# Patient Record
Sex: FEMALE | Race: WHITE | Employment: UNEMPLOYED | URBAN - METROPOLITAN AREA
[De-identification: names, ages, dates, MRNs, and addresses within clinical notes are randomized per-mention and may not be internally consistent; named-entity substitution may affect disease eponyms.]

---

## 2017-05-04 ENCOUNTER — APPOINTMENT (OUTPATIENT)
Dept: CT IMAGING | Age: 52
End: 2017-05-04
Attending: EMERGENCY MEDICINE
Payer: COMMERCIAL

## 2017-05-04 ENCOUNTER — HOSPITAL ENCOUNTER (EMERGENCY)
Age: 52
Discharge: HOME | End: 2017-05-04
Attending: EMERGENCY MEDICINE | Admitting: EMERGENCY MEDICINE
Payer: COMMERCIAL

## 2017-05-04 VITALS
RESPIRATION RATE: 20 BRPM | HEIGHT: 64 IN | SYSTOLIC BLOOD PRESSURE: 106 MMHG | TEMPERATURE: 97.9 F | BODY MASS INDEX: 36.47 KG/M2 | WEIGHT: 213.63 LBS | DIASTOLIC BLOOD PRESSURE: 46 MMHG | OXYGEN SATURATION: 95 % | HEART RATE: 78 BPM

## 2017-05-04 DIAGNOSIS — R10.9 FLANK PAIN: Primary | ICD-10-CM

## 2017-05-04 LAB
ALBUMIN SERPL BCP-MCNC: 3.7 G/DL (ref 3.5–5)
ALBUMIN/GLOB SERPL: 0.9 {RATIO} (ref 1.1–2.2)
ALP SERPL-CCNC: 87 U/L (ref 45–117)
ALT SERPL-CCNC: 27 U/L (ref 12–78)
ANION GAP BLD CALC-SCNC: 12 MMOL/L (ref 5–15)
ANION GAP BLD CALC-SCNC: 8 MMOL/L (ref 5–15)
APPEARANCE UR: ABNORMAL
AST SERPL W P-5'-P-CCNC: 16 U/L (ref 15–37)
BACTERIA URNS QL MICRO: NEGATIVE /HPF
BASOPHILS # BLD AUTO: 0 K/UL (ref 0–0.1)
BASOPHILS # BLD: 0 % (ref 0–1)
BILIRUB SERPL-MCNC: 0.8 MG/DL (ref 0.2–1)
BILIRUB UR QL: NEGATIVE
BUN SERPL-MCNC: 8 MG/DL (ref 6–20)
BUN SERPL-MCNC: 9 MG/DL (ref 6–20)
BUN/CREAT SERPL: 10 (ref 12–20)
BUN/CREAT SERPL: 9 (ref 12–20)
CALCIUM SERPL-MCNC: 8.7 MG/DL (ref 8.5–10.1)
CALCIUM SERPL-MCNC: 9 MG/DL (ref 8.5–10.1)
CHLORIDE SERPL-SCNC: 107 MMOL/L (ref 97–108)
CHLORIDE SERPL-SCNC: 108 MMOL/L (ref 97–108)
CO2 SERPL-SCNC: 23 MMOL/L (ref 21–32)
CO2 SERPL-SCNC: 25 MMOL/L (ref 21–32)
COLOR UR: ABNORMAL
CREAT SERPL-MCNC: 0.87 MG/DL (ref 0.55–1.02)
CREAT SERPL-MCNC: 0.91 MG/DL (ref 0.55–1.02)
EOSINOPHIL # BLD: 0 K/UL (ref 0–0.4)
EOSINOPHIL NFR BLD: 0 % (ref 0–7)
EPITH CASTS URNS QL MICRO: ABNORMAL /LPF
ERYTHROCYTE [DISTWIDTH] IN BLOOD BY AUTOMATED COUNT: 13.7 % (ref 11.5–14.5)
ERYTHROCYTE [DISTWIDTH] IN BLOOD BY AUTOMATED COUNT: 13.8 % (ref 11.5–14.5)
GLOBULIN SER CALC-MCNC: 4.1 G/DL (ref 2–4)
GLUCOSE SERPL-MCNC: 106 MG/DL (ref 65–100)
GLUCOSE SERPL-MCNC: 110 MG/DL (ref 65–100)
GLUCOSE UR STRIP.AUTO-MCNC: NEGATIVE MG/DL
HCT VFR BLD AUTO: 38.9 % (ref 35–47)
HCT VFR BLD AUTO: 39.2 % (ref 35–47)
HGB BLD-MCNC: 13.4 G/DL (ref 11.5–16)
HGB BLD-MCNC: 13.4 G/DL (ref 11.5–16)
HGB UR QL STRIP: ABNORMAL
INR PPP: 1.1 (ref 0.9–1.1)
KETONES UR QL STRIP.AUTO: 40 MG/DL
LEUKOCYTE ESTERASE UR QL STRIP.AUTO: NEGATIVE
LYMPHOCYTES # BLD AUTO: 14 % (ref 12–49)
LYMPHOCYTES # BLD: 1.8 K/UL (ref 0.8–3.5)
MCH RBC QN AUTO: 29.9 PG (ref 26–34)
MCH RBC QN AUTO: 30.1 PG (ref 26–34)
MCHC RBC AUTO-ENTMCNC: 34.2 G/DL (ref 30–36.5)
MCHC RBC AUTO-ENTMCNC: 34.4 G/DL (ref 30–36.5)
MCV RBC AUTO: 87.4 FL (ref 80–99)
MCV RBC AUTO: 87.5 FL (ref 80–99)
MONOCYTES # BLD: 0.6 K/UL (ref 0–1)
MONOCYTES NFR BLD AUTO: 5 % (ref 5–13)
MUCOUS THREADS URNS QL MICRO: ABNORMAL /LPF
NEUTS SEG # BLD: 10.6 K/UL (ref 1.8–8)
NEUTS SEG NFR BLD AUTO: 81 % (ref 32–75)
NITRITE UR QL STRIP.AUTO: NEGATIVE
PH UR STRIP: 6 [PH] (ref 5–8)
PLATELET # BLD AUTO: 237 K/UL (ref 150–400)
PLATELET # BLD AUTO: 249 K/UL (ref 150–400)
POTASSIUM SERPL-SCNC: 3.3 MMOL/L (ref 3.5–5.1)
POTASSIUM SERPL-SCNC: 3.4 MMOL/L (ref 3.5–5.1)
PROT SERPL-MCNC: 7.8 G/DL (ref 6.4–8.2)
PROT UR STRIP-MCNC: NEGATIVE MG/DL
PROTHROMBIN TIME: 10.7 SEC (ref 9–11.1)
RBC # BLD AUTO: 4.45 M/UL (ref 3.8–5.2)
RBC # BLD AUTO: 4.48 M/UL (ref 3.8–5.2)
RBC #/AREA URNS HPF: ABNORMAL /HPF (ref 0–5)
SODIUM SERPL-SCNC: 140 MMOL/L (ref 136–145)
SODIUM SERPL-SCNC: 143 MMOL/L (ref 136–145)
SP GR UR REFRACTOMETRY: 1.01 (ref 1–1.03)
UROBILINOGEN UR QL STRIP.AUTO: 0.2 EU/DL (ref 0.2–1)
WBC # BLD AUTO: 13 K/UL (ref 3.6–11)
WBC # BLD AUTO: 13.3 K/UL (ref 3.6–11)
WBC URNS QL MICRO: ABNORMAL /HPF (ref 0–4)
YEAST URNS QL MICRO: PRESENT

## 2017-05-04 PROCEDURE — 80048 BASIC METABOLIC PNL TOTAL CA: CPT | Performed by: EMERGENCY MEDICINE

## 2017-05-04 PROCEDURE — 85610 PROTHROMBIN TIME: CPT | Performed by: EMERGENCY MEDICINE

## 2017-05-04 PROCEDURE — 85027 COMPLETE CBC AUTOMATED: CPT | Performed by: EMERGENCY MEDICINE

## 2017-05-04 PROCEDURE — 74011250637 HC RX REV CODE- 250/637: Performed by: EMERGENCY MEDICINE

## 2017-05-04 PROCEDURE — 96374 THER/PROPH/DIAG INJ IV PUSH: CPT

## 2017-05-04 PROCEDURE — 74177 CT ABD & PELVIS W/CONTRAST: CPT

## 2017-05-04 PROCEDURE — 81001 URINALYSIS AUTO W/SCOPE: CPT | Performed by: EMERGENCY MEDICINE

## 2017-05-04 PROCEDURE — 96361 HYDRATE IV INFUSION ADD-ON: CPT

## 2017-05-04 PROCEDURE — 36415 COLL VENOUS BLD VENIPUNCTURE: CPT | Performed by: EMERGENCY MEDICINE

## 2017-05-04 PROCEDURE — 80053 COMPREHEN METABOLIC PANEL: CPT | Performed by: EMERGENCY MEDICINE

## 2017-05-04 PROCEDURE — 99284 EMERGENCY DEPT VISIT MOD MDM: CPT

## 2017-05-04 PROCEDURE — 74011250636 HC RX REV CODE- 250/636: Performed by: EMERGENCY MEDICINE

## 2017-05-04 PROCEDURE — 96375 TX/PRO/DX INJ NEW DRUG ADDON: CPT

## 2017-05-04 PROCEDURE — 74011636320 HC RX REV CODE- 636/320: Performed by: EMERGENCY MEDICINE

## 2017-05-04 PROCEDURE — 85025 COMPLETE CBC W/AUTO DIFF WBC: CPT | Performed by: EMERGENCY MEDICINE

## 2017-05-04 RX ORDER — MORPHINE SULFATE 2 MG/ML
4 INJECTION, SOLUTION INTRAMUSCULAR; INTRAVENOUS ONCE
Status: COMPLETED | OUTPATIENT
Start: 2017-05-04 | End: 2017-05-04

## 2017-05-04 RX ORDER — SODIUM CHLORIDE 0.9 % (FLUSH) 0.9 %
10 SYRINGE (ML) INJECTION
Status: COMPLETED | OUTPATIENT
Start: 2017-05-04 | End: 2017-05-04

## 2017-05-04 RX ORDER — OXYCODONE AND ACETAMINOPHEN 5; 325 MG/1; MG/1
2 TABLET ORAL
Status: COMPLETED | OUTPATIENT
Start: 2017-05-04 | End: 2017-05-04

## 2017-05-04 RX ORDER — MORPHINE SULFATE 4 MG/ML
4 INJECTION, SOLUTION INTRAMUSCULAR; INTRAVENOUS ONCE
Status: DISCONTINUED | OUTPATIENT
Start: 2017-05-04 | End: 2017-05-04 | Stop reason: HOSPADM

## 2017-05-04 RX ORDER — ONDANSETRON 2 MG/ML
4 INJECTION INTRAMUSCULAR; INTRAVENOUS
Status: DISCONTINUED | OUTPATIENT
Start: 2017-05-04 | End: 2017-05-04 | Stop reason: HOSPADM

## 2017-05-04 RX ORDER — SODIUM CHLORIDE 9 MG/ML
50 INJECTION, SOLUTION INTRAVENOUS
Status: COMPLETED | OUTPATIENT
Start: 2017-05-04 | End: 2017-05-04

## 2017-05-04 RX ORDER — DIPHENHYDRAMINE HYDROCHLORIDE 50 MG/ML
25 INJECTION, SOLUTION INTRAMUSCULAR; INTRAVENOUS
Status: COMPLETED | OUTPATIENT
Start: 2017-05-04 | End: 2017-05-04

## 2017-05-04 RX ORDER — OXYCODONE AND ACETAMINOPHEN 10; 325 MG/1; MG/1
1 TABLET ORAL
Qty: 10 TAB | Refills: 0 | Status: SHIPPED | OUTPATIENT
Start: 2017-05-04 | End: 2017-05-19

## 2017-05-04 RX ADMIN — OXYCODONE HYDROCHLORIDE AND ACETAMINOPHEN 2 TABLET: 5; 325 TABLET ORAL at 14:54

## 2017-05-04 RX ADMIN — SODIUM CHLORIDE 1000 ML: 900 INJECTION, SOLUTION INTRAVENOUS at 14:18

## 2017-05-04 RX ADMIN — DIPHENHYDRAMINE HYDROCHLORIDE 25 MG: 50 INJECTION, SOLUTION INTRAMUSCULAR; INTRAVENOUS at 14:53

## 2017-05-04 RX ADMIN — Medication 4 MG: at 12:51

## 2017-05-04 RX ADMIN — SODIUM CHLORIDE 50 ML/HR: 900 INJECTION, SOLUTION INTRAVENOUS at 13:58

## 2017-05-04 RX ADMIN — SODIUM CHLORIDE 1000 ML: 900 INJECTION, SOLUTION INTRAVENOUS at 12:50

## 2017-05-04 RX ADMIN — Medication 10 ML: at 13:58

## 2017-05-04 RX ADMIN — IOPAMIDOL 100 ML: 755 INJECTION, SOLUTION INTRAVENOUS at 13:58

## 2017-05-04 NOTE — ED PROVIDER NOTES
HPI Comments: Shirin Finn is a 45 y.o. female with pertinent PMHx of GERD who presents via EMS to ED c/o right flank pain and RLQ pain for the past 3 days, along with associated nausea, vomiting, and decreased appetite. Pt also notes moderate right sided back pain recently. Pt states her pain has worsened to 10/10 today. Pt's spouse had similar symptoms last week. EMS reports administering Fentanyl, which did not improve her pain. Pt adds that she has chronic right leg pain and right leg numbness, but she denies any recent change or worsening. Her right foot is not cold, and she has no rash on the right foot. Her PCP has discontinued all narcotic pain medication. Pt denies any fever, chills, chest pain, shortness of breath. Social Hx:  tobacco (former, 0.75 pk yrs), EtOH (rare)  There are no other complaints, changes or physical findings at this time. The history is provided by the patient and the EMS personnel. No  was used. Past Medical History:  
Diagnosis Date  Adverse effect of anesthesia HARD TO WAKE UP  
 Arthritis  Chronic pain BACK  Coagulation disorder ([address] HX ANEMIA  GERD (gastroesophageal reflux disease) OCCASIONAL  
 Nausea & vomiting  Psychiatric disorder ANXIETY AND DEPRESSION  
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  
 Unspecified adverse effect of anesthesia TROUBLE WAKING IN THE PAST Past Surgical History:  
Procedure Laterality Date [address] [place]  HX GYN  2006 TUBAL PREGNANCY AFTER TUBAL LIGATION  
 HX GYN  2010 UTERINE POLYPS REMOVED  HX GYN  2010 THINNING OF UTERUS  
 HX HEENT    
 MOUTH/DENTAL SURGERY  
 HX OTHER SURGICAL    
 R FOOT  
 HX TUBAL LIGATION  2006 Family History:  
Problem Relation Age of Onset  Hypertension Father  Cancer Father BLADDER  
 Other Father BLOOD CLOTS  
 COPD Father  Heart Disease Paternal Grandmother  Anesth Problems Neg Hx  Seizures Brother  Seizures Brother  Seizures Brother  Other Son BENIGN BONE MARROW TUMORS  
 Seizures Brother  Psychiatric Disorder Brother DEPRESSION Social History Social History  Marital status:  Spouse name: N/A  
 Number of children: N/A  
 Years of education: N/A Occupational History  Not on file. Social History Main Topics  Smoking status: Former Smoker Packs/day: 0.50 Years: 1.50  Smokeless tobacco: Never Used Comment: AS TEENAGER  
 Alcohol use Yes Comment: RARELY  Drug use: No  
 Sexual activity: Not on file Other Topics Concern  Not on file Social History Narrative ALLERGIES: Latex Review of Systems Constitutional: Positive for appetite change. Negative for activity change, chills, fever and unexpected weight change. HENT: Negative for congestion. Eyes: Negative for pain and visual disturbance. Respiratory: Negative for cough and shortness of breath. Cardiovascular: Negative for chest pain. Gastrointestinal: Positive for abdominal pain, nausea and vomiting. Negative for diarrhea. Genitourinary: Positive for flank pain. Negative for dysuria. Musculoskeletal: Positive for back pain and myalgias. Skin: Negative for rash. Neurological: Positive for numbness. Negative for headaches. Patient Vitals for the past 12 hrs: 
 Temp Pulse Resp BP SpO2  
05/04/17 1530 - - - 106/46 97 % 05/04/17 1500 - - - 109/46 94 % 05/04/17 1410 - - - 97/46 (!) 88 % 05/04/17 1405 - - - - 98 % 05/04/17 1338 - - - - 97 % 05/04/17 1330 - - - 114/59 98 % 05/04/17 1227 97.9 °F (36.6 °C) 78 20 108/57 99 % Physical Exam  
Constitutional: She is oriented to person, place, and time. She appears well-developed and well-nourished. Morbidly obese female in moderate distress secondary to severity of pain.    
HENT:  
Head: Normocephalic and atraumatic. Mouth/Throat: Oropharynx is clear and moist.  
Eyes: Conjunctivae and EOM are normal. Pupils are equal, round, and reactive to light. Right eye exhibits no discharge. Left eye exhibits no discharge. Neck: Normal range of motion. Neck supple. Cardiovascular: Normal rate and normal heart sounds. No murmur heard. Right leg with good DP pulse. Pulmonary/Chest: Effort normal and breath sounds normal. No respiratory distress. She has no wheezes. She has no rales. Abdominal: Soft. Bowel sounds are normal.  
Protuberant with tenderness on any superficial palpation, however without any tenderness when listening to bowel sounds with deep pressure at the stethoscope. Tenderness to right flank. Musculoskeletal: Normal range of motion. Excruciating tenderness in right leg from knee to toe (baseline per patient). Diffuse back pain on right side with right upper, right midline, right lower, and left lower back tenderness. Neurological: She is alert and oriented to person, place, and time. No cranial nerve deficit. She exhibits normal muscle tone. Skin: Skin is warm and dry. No rash noted. She is not diaphoretic. Nursing note and vitals reviewed. MDM Number of Diagnoses or Management Options Diagnosis management comments: Pt with a long history of chronic pain, but when given options for pain relief she refuses all medications as nothing has worked for her in the past, and she wants IV narcotics. However, given respiratory arrest after IV drug use during last surgery will give small doses of opiates, and monitor respiratory rate and oxygen saturation closely. Despite complaint of abdominal pain with leg numbness, pt explains that her leg symptoms are unchanged since last surgery, and she have been evaluated and treated extensively; thus doubt iliac dissection or femoral thrombus.  Flank pain possibly muscular as the pt lays around all day and does not ambulate vs renal colic. Will initiate lab and radiologic evaluation. Amount and/or Complexity of Data Reviewed Clinical lab tests: reviewed and ordered Tests in the radiology section of CPT®: ordered and reviewed Review and summarize past medical records: yes Patient Progress Patient progress: stable ED Course Procedures 14:15 Pt complains of continues pain, however noted pulse ox decreased when at bedside for recheck. With deep breaths and awareness, level improved, but recommended NC when pt on opiates and hold further IV doses. Offered PO medication to [adant but she shoots down all options and states nothing is going to work. Await CT results. Continues with dehydration with concentrated urine, will repeat fluid bolus. 16:15 Discussed results and further care at home. Pt has appt with new PMD in 2 weeks. RP given. LABORATORY TESTS: 
Recent Results (from the past 12 hour(s)) CBC W/O DIFF Collection Time: 05/04/17 12:36 PM  
Result Value Ref Range WBC 13.3 (H) 3.6 - 11.0 K/uL  
 RBC 4.48 3.80 - 5.20 M/uL  
 HGB 13.4 11.5 - 16.0 g/dL HCT 39.2 35.0 - 47.0 % MCV 87.5 80.0 - 99.0 FL  
 MCH 29.9 26.0 - 34.0 PG  
 MCHC 34.2 30.0 - 36.5 g/dL  
 RDW 13.7 11.5 - 14.5 % PLATELET 524 613 - 283 K/uL METABOLIC PANEL, COMPREHENSIVE Collection Time: 05/04/17 12:36 PM  
Result Value Ref Range Sodium 140 136 - 145 mmol/L Potassium 3.3 (L) 3.5 - 5.1 mmol/L Chloride 107 97 - 108 mmol/L  
 CO2 25 21 - 32 mmol/L Anion gap 8 5 - 15 mmol/L Glucose 110 (H) 65 - 100 mg/dL BUN 9 6 - 20 MG/DL Creatinine 0.87 0.55 - 1.02 MG/DL  
 BUN/Creatinine ratio 10 (L) 12 - 20 GFR est AA >60 >60 ml/min/1.73m2 GFR est non-AA >60 >60 ml/min/1.73m2 Calcium 8.7 8.5 - 10.1 MG/DL Bilirubin, total 0.8 0.2 - 1.0 MG/DL  
 ALT (SGPT) 27 12 - 78 U/L  
 AST (SGOT) 16 15 - 37 U/L Alk. phosphatase 87 45 - 117 U/L Protein, total 7.8 6.4 - 8.2 g/dL Albumin 3.7 3.5 - 5.0 g/dL Globulin 4.1 (H) 2.0 - 4.0 g/dL A-G Ratio 0.9 (L) 1.1 - 2.2 URINALYSIS W/ RFLX MICROSCOPIC Collection Time: 05/04/17 12:36 PM  
Result Value Ref Range Color YELLOW/STRAW Appearance CLOUDY (A) CLEAR Specific gravity 1.014 1.003 - 1.030    
 pH (UA) 6.0 5.0 - 8.0 Protein NEGATIVE  NEG mg/dL Glucose NEGATIVE  NEG mg/dL Ketone 40 (A) NEG mg/dL Bilirubin NEGATIVE  NEG Blood LARGE (A) NEG Urobilinogen 0.2 0.2 - 1.0 EU/dL Nitrites NEGATIVE  NEG Leukocyte Esterase NEGATIVE  NEG    
 WBC 0-4 0 - 4 /hpf  
 RBC 5-10 0 - 5 /hpf Epithelial cells FEW FEW /lpf Bacteria NEGATIVE  NEG /hpf Mucus 1+ (A) NEG /lpf Yeast PRESENT (A) NEG    
CBC WITH AUTOMATED DIFF Collection Time: 05/04/17 12:36 PM  
Result Value Ref Range WBC 13.0 (H) 3.6 - 11.0 K/uL  
 RBC 4.45 3.80 - 5.20 M/uL  
 HGB 13.4 11.5 - 16.0 g/dL HCT 38.9 35.0 - 47.0 % MCV 87.4 80.0 - 99.0 FL  
 MCH 30.1 26.0 - 34.0 PG  
 MCHC 34.4 30.0 - 36.5 g/dL  
 RDW 13.8 11.5 - 14.5 % PLATELET 238 711 - 708 K/uL NEUTROPHILS 81 (H) 32 - 75 % LYMPHOCYTES 14 12 - 49 % MONOCYTES 5 5 - 13 % EOSINOPHILS 0 0 - 7 % BASOPHILS 0 0 - 1 %  
 ABS. NEUTROPHILS 10.6 (H) 1.8 - 8.0 K/UL  
 ABS. LYMPHOCYTES 1.8 0.8 - 3.5 K/UL  
 ABS. MONOCYTES 0.6 0.0 - 1.0 K/UL  
 ABS. EOSINOPHILS 0.0 0.0 - 0.4 K/UL  
 ABS. BASOPHILS 0.0 0.0 - 0.1 K/UL METABOLIC PANEL, BASIC Collection Time: 05/04/17 12:36 PM  
Result Value Ref Range Sodium 143 136 - 145 mmol/L Potassium 3.4 (L) 3.5 - 5.1 mmol/L Chloride 108 97 - 108 mmol/L  
 CO2 23 21 - 32 mmol/L Anion gap 12 5 - 15 mmol/L Glucose 106 (H) 65 - 100 mg/dL BUN 8 6 - 20 MG/DL Creatinine 0.91 0.55 - 1.02 MG/DL  
 BUN/Creatinine ratio 9 (L) 12 - 20 GFR est AA >60 >60 ml/min/1.73m2 GFR est non-AA >60 >60 ml/min/1.73m2 Calcium 9.0 8.5 - 10.1 MG/DL PROTHROMBIN TIME + INR Collection Time: 05/04/17  1:07 PM  
Result Value Ref Range  INR 1.1 0.9 - 1.1 Prothrombin time 10.7 9.0 - 11.1 sec IMAGING RESULTS: 
CT Results  (Last 48 hours) 05/04/17 1359  CT ABD PELV W CONT Final result Impression:  IMPRESSION:   
1. No evidence of appendicitis or other acute abdominal or pelvic abnormality. 2. Obesity with hepatic steatosis. 3. Septate uterus. Stas Homar Narrative:  EXAM: CT ABDOMEN PELVIS WITH CONTRAST INDICATION:  Right flank and right lower quadrant pain, evaluate for  
appendicitis. COMPARISON: None. CONTRAST: 100 mL of Isovue-370. TECHNIQUE:   
Multislice helical CT was performed from the diaphragm to the symphysis pubis  
during uneventful rapid bolus intravenous contrast administration. Oral contrast  
was not administered. Contiguous 5 mm axial images were reconstructed and lung  
and soft tissue windows were generated. Coronal and sagittal reformations were  
generated. CT dose reduction was achieved through use of a standardized protocol  
tailored for this examination and automatic exposure control for dose  
modulation. FINDINGS:   
The patient is obese. LOWER CHEST: The visualized portions of the lung bases are clear. There is mild  
atelectasis at the lung bases. ABDOMEN:  
Liver: The liver is normal in size and contour with no focal abnormality. The  
attenuation of the liver is diffusely decreased. Gallbladder and bile ducts: There are clips in the gallbladder fossa and the  
gallbladder is absent. Spleen: No abnormality. Pancreas: No abnormality. Adrenal glands: No abnormality. Kidneys: No abnormality. PELVIS:  
Reproductive organs: The there is a septate uterus. Bladder: No abnormality. BOWEL AND MESENTERY: The small bowel is normal.  There is no mesenteric mass or  
adenopathy. The appendix is normal.  
   
PERITONEUM: There is no ascites or free intraperitoneal air. RETROPERITONEUM: The aorta  tapers without aneurysm.  The left renal vein is circumaortic. There is no retroperitoneal adenopathy or mass. There is no pelvic  
mass or adenopathy. BONES AND SOFT TISSUES: The bones and soft tissues of the abdominal wall are  
within normal limits. MEDICATIONS GIVEN: 
Medications  
ondansetron (ZOFRAN) injection 4 mg (not administered) morphine injection 4 mg (0 mg IntraVENous Held 5/4/17 1418) morphine injection 4 mg (4 mg IntraVENous Given 5/4/17 1251)  
sodium chloride 0.9 % bolus infusion 1,000 mL (0 mL IntraVENous IV Completed 5/4/17 1350) iopamidol (ISOVUE-370) 76 % injection 100 mL (100 mL IntraVENous Given 5/4/17 1358)  
sodium chloride (NS) flush 10 mL (10 mL IntraVENous Given 5/4/17 1358)  
0.9% sodium chloride infusion (0 mL/hr IntraVENous IV Completed 5/4/17 1415)  
sodium chloride 0.9 % bolus infusion 1,000 mL (0 mL IntraVENous IV Completed 5/4/17 1511) diphenhydrAMINE (BENADRYL) injection 25 mg (25 mg IntraVENous Given 5/4/17 1453) oxyCODONE-acetaminophen (PERCOCET) 5-325 mg per tablet 2 Tab (2 Tabs Oral Given 5/4/17 1454) IMPRESSION: 
1. Flank pain PLAN: 
1. Current Discharge Medication List  
  
START taking these medications Details  
oxyCODONE-acetaminophen (PERCOCET)  mg per tablet Take 1 Tab by mouth every six (6) hours as needed for Pain. Max Daily Amount: 4 Tabs. Qty: 10 Tab, Refills: 0 CONTINUE these medications which have NOT CHANGED Details  
enoxaparin (LOVENOX) 40 mg/0.4 mL 0.4 mL by SubCUTAneous route daily. Indications: DEEP VEIN THROMBOSIS PREVENTION Qty: 10 Syringe, Refills: 0  
  
warfarin (COUMADIN) 4 mg tablet Take 1 Tab by mouth daily. Indications: DEEP VEIN THROMBOSIS PREVENTION Qty: 30 Tab, Refills: 1 STOP taking these medications  
  
 oxyCODONE-acetaminophen (PERCOCET 7.5) 7.5-325 mg per tablet Comments:  
Reason for Stopping:   
   
 promethazine (PHENERGAN) 25 mg tablet Comments:  
Reason for Stopping:   
   
 promethazine (PHENERGAN) 25 mg tablet Comments:  
Reason for Stoppin.  
Follow-up Information Follow up With Details Comments Contact Info [facility] EMERGENCY DEPT  If symptoms worsen [address] [facility] [address] [facility] 
[phone] Return to ED if worse DISCHARGE NOTE 
4:01 PM 
The patient has been re-evaluated and is ready for discharge. Reviewed available results with patient. Counseled pt on diagnosis and care plan. Pt has expressed understanding, and all questions have been answered. Pt agrees with plan and agrees to follow up as recommended, or return to the ED if their symptoms worsen. Discharge instructions have been provided and explained to the pt, along with reasons to return to the ED. Attestations: This note is prepared by Leigha Morrow. Allison Olea, acting as Scribe for Aruna Coello MD. Aruna Coello MD: The scribe's documentation has been prepared under my direction and personally reviewed by me in its entirety. I confirm that the note above accurately reflects all work, treatment, procedures, and medical decision making performed by me.

## 2017-05-04 NOTE — DISCHARGE INSTRUCTIONS
Abdominal Pain: Care Instructions  Your Care Instructions    Abdominal pain has many possible causes. Some aren't serious and get better on their own in a few days. Others need more testing and treatment. If your pain continues or gets worse, you need to be rechecked and may need more tests to find out what is wrong. You may need surgery to correct the problem. Don't ignore new symptoms, such as fever, nausea and vomiting, urination problems, pain that gets worse, and dizziness. These may be signs of a more serious problem. Your doctor may have recommended a follow-up visit in the next 8 to 12 hours. If you are not getting better, you may need more tests or treatment. The doctor has checked you carefully, but problems can develop later. If you notice any problems or new symptoms, get medical treatment right away. Follow-up care is a key part of your treatment and safety. Be sure to make and go to all appointments, and call your doctor if you are having problems. It's also a good idea to know your test results and keep a list of the medicines you take. How can you care for yourself at home? · Rest until you feel better. · To prevent dehydration, drink plenty of fluids, enough so that your urine is light yellow or clear like water. Choose water and other caffeine-free clear liquids until you feel better. If you have kidney, heart, or liver disease and have to limit fluids, talk with your doctor before you increase the amount of fluids you drink. · If your stomach is upset, eat mild foods, such as rice, dry toast or crackers, bananas, and applesauce. Try eating several small meals instead of two or three large ones. · Wait until 48 hours after all symptoms have gone away before you have spicy foods, alcohol, and drinks that contain caffeine. · Do not eat foods that are high in fat. · Avoid anti-inflammatory medicines such as aspirin, ibuprofen (Advil, Motrin), and naproxen (Aleve).  These can cause stomach upset. Talk to your doctor if you take daily aspirin for another health problem. When should you call for help? Call 911 anytime you think you may need emergency care. For example, call if:  · You passed out (lost consciousness). · You pass maroon or very bloody stools. · You vomit blood or what looks like coffee grounds. · You have new, severe belly pain. Call your doctor now or seek immediate medical care if:  · Your pain gets worse, especially if it becomes focused in one area of your belly. · You have a new or higher fever. · Your stools are black and look like tar, or they have streaks of blood. · You have unexpected vaginal bleeding. · You have symptoms of a urinary tract infection. These may include:  ¨ Pain when you urinate. ¨ Urinating more often than usual.  ¨ Blood in your urine. · You are dizzy or lightheaded, or you feel like you may faint. Watch closely for changes in your health, and be sure to contact your doctor if:  · You are not getting better after 1 day (24 hours). Where can you learn more? Go to http://monaModtiheriberto.info/. Enter H447 in the search box to learn more about \"Abdominal Pain: Care Instructions. \"  Current as of: May 27, 2016  Content Version: 11.2  © 9235-7426 [employer]. Care instructions adapted under license by [employer] (which disclaims liability or warranty for this information). If you have questions about a medical condition or this instruction, always ask your healthcare professional. [place] [address] any warranty or liability for your use of this information. Flank Pain: Care Instructions  Your Care Instructions  Flank pain is pain on the side of the back just below the rib cage and above the waist. It can be on one or both sides. Flank pain has many possible causes, including a kidney stone, a urinary tract infection, or back strain. Flank pain may get better on its own.  But don't ignore new symptoms, such as fever, nausea and vomiting, urination problems, pain that gets worse, and dizziness. These may be signs of a more serious problem. You may have to have tests or other treatment. Follow-up care is a key part of your treatment and safety. Be sure to make and go to all appointments, and call your doctor if you are having problems. It's also a good idea to know your test results and keep a list of the medicines you take. How can you care for yourself at home? · Rest until you feel better. · Take pain medicines exactly as directed. ¨ If the doctor gave you a prescription medicine for pain, take it as prescribed. ¨ If you are not taking a prescription pain medicine, ask your doctor if you can take an over-the-counter pain medicine, such as acetaminophen (Tylenol), ibuprofen (Advil, Motrin), or naproxen (Aleve). Read and follow all instructions on the label. · If your doctor prescribed antibiotics, take them as directed. Do not stop taking them just because you feel better. You need to take the full course of antibiotics. · To apply heat, put a warm water bottle, a heating pad set on low, or a warm cloth on the painful area. Do not go to sleep with a heating pad on your skin. · To prevent dehydration, drink plenty of fluids, enough so that your urine is light yellow or clear like water. Choose water and other caffeine-free clear liquids until you feel better. If you have kidney, heart, or liver disease and have to limit fluids, talk with your doctor before you increase the amount of fluids you drink. When should you call for help? Call your doctor now or seek immediate medical care if:  · Your flank pain gets worse. · You have new symptoms, such as fever, nausea, or vomiting. · You have symptoms of a urinary problem. For example:  ¨ You have blood or pus in your urine. ¨ You have chills or body aches. ¨ It hurts to urinate. ¨ You have groin or belly pain.   Watch closely for changes in your health, and be sure to contact your doctor if you do not get better as expected. Where can you learn more? Go to http://mona-heriberto.info/. Enter S191 in the search box to learn more about \"Flank Pain: Care Instructions. \"  Current as of: May 27, 2016  Content Version: 11.2  © 4622-0536 "[employer]. Care instructions adapted under license by [employer] (which disclaims liability or warranty for this information). If you have questions about a medical condition or this instruction, always ask your healthcare professional. [address] any warranty or liability for your use of this information.

## 2017-05-04 NOTE — ED NOTES
Pt. Placed on 2 liters of oxygen to maintain sa02 > 90%. Morphine held due to BP of ~95/45. 2nd liter of fluids started. Pt. Informed of purpose for withholding morphine

## 2017-05-04 NOTE — ED NOTES
MD Laboy has reviewed discharge instructions with the patient. The patient verbalized understanding. Pt discharged with written instructions. No further concerns at this time. Pt ambulatory to exit.

## 2017-05-19 ENCOUNTER — HOSPITAL ENCOUNTER (OUTPATIENT)
Dept: PREADMISSION TESTING | Age: 52
Discharge: HOME | End: 2017-05-19
Payer: COMMERCIAL

## 2017-05-19 VITALS
TEMPERATURE: 98 F | WEIGHT: 201 LBS | HEART RATE: 92 BPM | BODY MASS INDEX: 34.31 KG/M2 | SYSTOLIC BLOOD PRESSURE: 101 MMHG | HEIGHT: 64 IN | DIASTOLIC BLOOD PRESSURE: 69 MMHG

## 2017-05-19 LAB
25(OH)D3 SERPL-MCNC: 14.9 NG/ML (ref 30–100)
ATRIAL RATE: 82 BPM
CALCULATED P AXIS, ECG09: 40 DEGREES
CALCULATED R AXIS, ECG10: 33 DEGREES
CALCULATED T AXIS, ECG11: 24 DEGREES
DIAGNOSIS, 93000: NORMAL
P-R INTERVAL, ECG05: 170 MS
Q-T INTERVAL, ECG07: 392 MS
QRS DURATION, ECG06: 86 MS
QTC CALCULATION (BEZET), ECG08: 457 MS
VENTRICULAR RATE, ECG03: 82 BPM

## 2017-05-19 PROCEDURE — 36415 COLL VENOUS BLD VENIPUNCTURE: CPT | Performed by: PODIATRIST

## 2017-05-19 PROCEDURE — 82306 VITAMIN D 25 HYDROXY: CPT | Performed by: PODIATRIST

## 2017-05-19 PROCEDURE — 93005 ELECTROCARDIOGRAM TRACING: CPT

## 2017-05-19 RX ORDER — LAMOTRIGINE 100 MG/1
100 TABLET ORAL DAILY
COMMUNITY
End: 2019-03-06

## 2017-05-19 RX ORDER — OXYCODONE AND ACETAMINOPHEN 10; 325 MG/1; MG/1
1 TABLET ORAL
COMMUNITY
End: 2017-06-02

## 2017-05-19 RX ORDER — PROMETHAZINE HYDROCHLORIDE 25 MG/1
25 TABLET ORAL
COMMUNITY
End: 2017-06-02

## 2017-05-19 RX ORDER — QUETIAPINE FUMARATE 25 MG/1
75 TABLET, FILM COATED ORAL
COMMUNITY

## 2017-05-19 NOTE — PERIOP NOTES
PATIENT GIVEN SURGICAL SITE INFECTION FAQ HANDOUT AND HAND WASHING TIP SHEET. PREOP INSTRUCTIONS REVIEWED AND PATIENT VERBALIZES UNDERSTANDING OF INSTRUCTIONS. PATIENT HAS BEEN GIVEN THE OPPORTUNITY TO ASK ADDITIONAL QUESTIONS. GAVE 2 PACKAGES OF CHG WIPES AND INSTRUCTIONS. PATIENT VERBALIZED UNDERSTANDING.

## 2017-05-20 LAB
BACTERIA SPEC CULT: NORMAL
BACTERIA SPEC CULT: NORMAL
SERVICE CMNT-IMP: NORMAL

## 2017-05-22 NOTE — PERIOP NOTES
FAXED PRE-ADMISSION TESTING REPORTS (AND FAX CONFIRMATION RECEIVED TO DR. NÚÑEZ'S OFFICE.  CALLED ON 5/22/17  AT 1355  AND LEFT MESSAGE ON YANNI'S -NURSE VOICEMAIL, RE: ABNORMAL VITAMIN D 14.9 (L), WBC 13.0 (H), POTASSIUM 3.3 (L)

## 2017-06-01 ENCOUNTER — ANESTHESIA EVENT (OUTPATIENT)
Dept: SURGERY | Age: 52
End: 2017-06-01
Payer: COMMERCIAL

## 2017-06-01 ENCOUNTER — APPOINTMENT (OUTPATIENT)
Dept: GENERAL RADIOLOGY | Age: 52
End: 2017-06-01
Attending: PODIATRIST
Payer: COMMERCIAL

## 2017-06-01 ENCOUNTER — ANESTHESIA (OUTPATIENT)
Dept: SURGERY | Age: 52
End: 2017-06-01
Payer: COMMERCIAL

## 2017-06-01 ENCOUNTER — HOSPITAL ENCOUNTER (OUTPATIENT)
Age: 52
Setting detail: OBSERVATION
Discharge: HOME | End: 2017-06-02
Attending: PODIATRIST | Admitting: PODIATRIST
Payer: COMMERCIAL

## 2017-06-01 PROCEDURE — 77030008684 HC TU ET CUF COVD -B: Performed by: ANESTHESIOLOGY

## 2017-06-01 PROCEDURE — 77030022324 HC BUR OVAL AGRRES BRSM -B: Performed by: PODIATRIST

## 2017-06-01 PROCEDURE — C1713 ANCHOR/SCREW BN/BN,TIS/BN: HCPCS | Performed by: PODIATRIST

## 2017-06-01 PROCEDURE — 99218 HC RM OBSERVATION: CPT

## 2017-06-01 PROCEDURE — 76000 FLUOROSCOPY <1 HR PHYS/QHP: CPT

## 2017-06-01 PROCEDURE — 74011250636 HC RX REV CODE- 250/636: Performed by: PODIATRIST

## 2017-06-01 PROCEDURE — 76060000036 HC ANESTHESIA 2.5 TO 3 HR: Performed by: PODIATRIST

## 2017-06-01 PROCEDURE — 76210000016 HC OR PH I REC 1 TO 1.5 HR: Performed by: PODIATRIST

## 2017-06-01 PROCEDURE — 77030028224 HC PDNG CST BSNM -A: Performed by: PODIATRIST

## 2017-06-01 PROCEDURE — 77030004401: Performed by: PODIATRIST

## 2017-06-01 PROCEDURE — 77030020269 HC MISC IMPL: Performed by: PODIATRIST

## 2017-06-01 PROCEDURE — 76010000132 HC OR TIME 2.5 TO 3 HR: Performed by: PODIATRIST

## 2017-06-01 PROCEDURE — 73630 X-RAY EXAM OF FOOT: CPT

## 2017-06-01 PROCEDURE — 74011000250 HC RX REV CODE- 250

## 2017-06-01 PROCEDURE — 74011250636 HC RX REV CODE- 250/636

## 2017-06-01 PROCEDURE — 74011000250 HC RX REV CODE- 250: Performed by: PODIATRIST

## 2017-06-01 PROCEDURE — 77030026438 HC STYL ET INTUB CARD -A: Performed by: ANESTHESIOLOGY

## 2017-06-01 PROCEDURE — 77030011640 HC PAD GRND REM COVD -A: Performed by: PODIATRIST

## 2017-06-01 PROCEDURE — 77030032490 HC SLV COMPR SCD KNE COVD -B

## 2017-06-01 PROCEDURE — 77030020268 HC MISC GENERAL SUPPLY: Performed by: PODIATRIST

## 2017-06-01 PROCEDURE — 77030018836 HC SOL IRR NACL ICUM -A: Performed by: PODIATRIST

## 2017-06-01 PROCEDURE — 77030002916 HC SUT ETHLN J&J -A: Performed by: PODIATRIST

## 2017-06-01 PROCEDURE — 77030000032 HC CUF TRNQT ZIMM -B: Performed by: PODIATRIST

## 2017-06-01 PROCEDURE — 77030034747: Performed by: PODIATRIST

## 2017-06-01 PROCEDURE — 77030031139 HC SUT VCRL2 J&J -A: Performed by: PODIATRIST

## 2017-06-01 PROCEDURE — 74011250637 HC RX REV CODE- 250/637: Performed by: PODIATRIST

## 2017-06-01 DEVICE — IMPLANTABLE DEVICE: Type: IMPLANTABLE DEVICE | Site: FOOT | Status: FUNCTIONAL

## 2017-06-01 RX ORDER — MORPHINE SULFATE 10 MG/ML
2 INJECTION, SOLUTION INTRAMUSCULAR; INTRAVENOUS
Status: DISCONTINUED | OUTPATIENT
Start: 2017-06-01 | End: 2017-06-01 | Stop reason: HOSPADM

## 2017-06-01 RX ORDER — SUCCINYLCHOLINE CHLORIDE 20 MG/ML
INJECTION INTRAMUSCULAR; INTRAVENOUS AS NEEDED
Status: DISCONTINUED | OUTPATIENT
Start: 2017-06-01 | End: 2017-06-01 | Stop reason: HOSPADM

## 2017-06-01 RX ORDER — CEFAZOLIN SODIUM IN 0.9 % NACL 2 G/50 ML
2 INTRAVENOUS SOLUTION, PIGGYBACK (ML) INTRAVENOUS
Status: COMPLETED | OUTPATIENT
Start: 2017-06-01 | End: 2017-06-01

## 2017-06-01 RX ORDER — DIPHENHYDRAMINE HYDROCHLORIDE 50 MG/ML
12.5 INJECTION, SOLUTION INTRAMUSCULAR; INTRAVENOUS AS NEEDED
Status: DISCONTINUED | OUTPATIENT
Start: 2017-06-01 | End: 2017-06-01 | Stop reason: HOSPADM

## 2017-06-01 RX ORDER — HYDROMORPHONE HYDROCHLORIDE 1 MG/ML
0.2 INJECTION, SOLUTION INTRAMUSCULAR; INTRAVENOUS; SUBCUTANEOUS
Status: DISCONTINUED | OUTPATIENT
Start: 2017-06-01 | End: 2017-06-01 | Stop reason: HOSPADM

## 2017-06-01 RX ORDER — LIDOCAINE HYDROCHLORIDE ANHYDROUS AND DEXTROSE MONOHYDRATE .8; 5 G/100ML; G/100ML
INJECTION, SOLUTION INTRAVENOUS
Status: DISCONTINUED | OUTPATIENT
Start: 2017-06-01 | End: 2017-06-01 | Stop reason: HOSPADM

## 2017-06-01 RX ORDER — ONDANSETRON 2 MG/ML
INJECTION INTRAMUSCULAR; INTRAVENOUS AS NEEDED
Status: DISCONTINUED | OUTPATIENT
Start: 2017-06-01 | End: 2017-06-01 | Stop reason: HOSPADM

## 2017-06-01 RX ORDER — FENTANYL CITRATE 50 UG/ML
50 INJECTION, SOLUTION INTRAMUSCULAR; INTRAVENOUS AS NEEDED
Status: DISCONTINUED | OUTPATIENT
Start: 2017-06-01 | End: 2017-06-01 | Stop reason: HOSPADM

## 2017-06-01 RX ORDER — LIDOCAINE HYDROCHLORIDE 10 MG/ML
0.1 INJECTION, SOLUTION EPIDURAL; INFILTRATION; INTRACAUDAL; PERINEURAL AS NEEDED
Status: DISCONTINUED | OUTPATIENT
Start: 2017-06-01 | End: 2017-06-01 | Stop reason: HOSPADM

## 2017-06-01 RX ORDER — MIDAZOLAM HYDROCHLORIDE 1 MG/ML
1 INJECTION, SOLUTION INTRAMUSCULAR; INTRAVENOUS AS NEEDED
Status: DISCONTINUED | OUTPATIENT
Start: 2017-06-01 | End: 2017-06-01 | Stop reason: HOSPADM

## 2017-06-01 RX ORDER — ACETAMINOPHEN 325 MG/1
650 TABLET ORAL
Status: DISCONTINUED | OUTPATIENT
Start: 2017-06-01 | End: 2017-06-02 | Stop reason: HOSPADM

## 2017-06-01 RX ORDER — SODIUM CHLORIDE, SODIUM LACTATE, POTASSIUM CHLORIDE, CALCIUM CHLORIDE 600; 310; 30; 20 MG/100ML; MG/100ML; MG/100ML; MG/100ML
INJECTION, SOLUTION INTRAVENOUS
Status: DISCONTINUED | OUTPATIENT
Start: 2017-06-01 | End: 2017-06-01 | Stop reason: HOSPADM

## 2017-06-01 RX ORDER — FENTANYL CITRATE 50 UG/ML
25 INJECTION, SOLUTION INTRAMUSCULAR; INTRAVENOUS
Status: DISCONTINUED | OUTPATIENT
Start: 2017-06-01 | End: 2017-06-01 | Stop reason: HOSPADM

## 2017-06-01 RX ORDER — NEOSTIGMINE METHYLSULFATE 1 MG/ML
INJECTION INTRAVENOUS AS NEEDED
Status: DISCONTINUED | OUTPATIENT
Start: 2017-06-01 | End: 2017-06-01 | Stop reason: HOSPADM

## 2017-06-01 RX ORDER — SODIUM CHLORIDE, SODIUM LACTATE, POTASSIUM CHLORIDE, CALCIUM CHLORIDE 600; 310; 30; 20 MG/100ML; MG/100ML; MG/100ML; MG/100ML
25 INJECTION, SOLUTION INTRAVENOUS CONTINUOUS
Status: DISCONTINUED | OUTPATIENT
Start: 2017-06-01 | End: 2017-06-01 | Stop reason: HOSPADM

## 2017-06-01 RX ORDER — SODIUM CHLORIDE 0.9 % (FLUSH) 0.9 %
5-10 SYRINGE (ML) INJECTION EVERY 8 HOURS
Status: DISCONTINUED | OUTPATIENT
Start: 2017-06-01 | End: 2017-06-01 | Stop reason: HOSPADM

## 2017-06-01 RX ORDER — OXYCODONE HYDROCHLORIDE 5 MG/1
5 TABLET ORAL AS NEEDED
Status: DISCONTINUED | OUTPATIENT
Start: 2017-06-01 | End: 2017-06-01 | Stop reason: HOSPADM

## 2017-06-01 RX ORDER — DEXAMETHASONE SODIUM PHOSPHATE 4 MG/ML
INJECTION, SOLUTION INTRA-ARTICULAR; INTRALESIONAL; INTRAMUSCULAR; INTRAVENOUS; SOFT TISSUE AS NEEDED
Status: DISCONTINUED | OUTPATIENT
Start: 2017-06-01 | End: 2017-06-01 | Stop reason: HOSPADM

## 2017-06-01 RX ORDER — ROCURONIUM BROMIDE 10 MG/ML
INJECTION, SOLUTION INTRAVENOUS AS NEEDED
Status: DISCONTINUED | OUTPATIENT
Start: 2017-06-01 | End: 2017-06-01 | Stop reason: HOSPADM

## 2017-06-01 RX ORDER — DEXMEDETOMIDINE HYDROCHLORIDE 4 UG/ML
INJECTION, SOLUTION INTRAVENOUS AS NEEDED
Status: DISCONTINUED | OUTPATIENT
Start: 2017-06-01 | End: 2017-06-01 | Stop reason: HOSPADM

## 2017-06-01 RX ORDER — QUETIAPINE FUMARATE 25 MG/1
75 TABLET, FILM COATED ORAL
Status: DISCONTINUED | OUTPATIENT
Start: 2017-06-01 | End: 2017-06-02 | Stop reason: HOSPADM

## 2017-06-01 RX ORDER — HYDROMORPHONE HCL IN 0.9% NACL 15 MG/30ML
PATIENT CONTROLLED ANALGESIA VIAL INTRAVENOUS
Status: COMPLETED
Start: 2017-06-01 | End: 2017-06-01

## 2017-06-01 RX ORDER — ACETAMINOPHEN 325 MG/1
650 TABLET ORAL ONCE
Status: DISCONTINUED | OUTPATIENT
Start: 2017-06-01 | End: 2017-06-01 | Stop reason: HOSPADM

## 2017-06-01 RX ORDER — FENTANYL CITRATE 50 UG/ML
INJECTION, SOLUTION INTRAMUSCULAR; INTRAVENOUS AS NEEDED
Status: DISCONTINUED | OUTPATIENT
Start: 2017-06-01 | End: 2017-06-01 | Stop reason: HOSPADM

## 2017-06-01 RX ORDER — SODIUM CHLORIDE, SODIUM LACTATE, POTASSIUM CHLORIDE, CALCIUM CHLORIDE 600; 310; 30; 20 MG/100ML; MG/100ML; MG/100ML; MG/100ML
125 INJECTION, SOLUTION INTRAVENOUS CONTINUOUS
Status: DISCONTINUED | OUTPATIENT
Start: 2017-06-01 | End: 2017-06-01 | Stop reason: HOSPADM

## 2017-06-01 RX ORDER — SODIUM CHLORIDE 9 MG/ML
25 INJECTION, SOLUTION INTRAVENOUS CONTINUOUS
Status: DISCONTINUED | OUTPATIENT
Start: 2017-06-01 | End: 2017-06-01 | Stop reason: HOSPADM

## 2017-06-01 RX ORDER — PROPOFOL 10 MG/ML
INJECTION, EMULSION INTRAVENOUS AS NEEDED
Status: DISCONTINUED | OUTPATIENT
Start: 2017-06-01 | End: 2017-06-01 | Stop reason: HOSPADM

## 2017-06-01 RX ORDER — SODIUM CHLORIDE 0.9 % (FLUSH) 0.9 %
5-10 SYRINGE (ML) INJECTION AS NEEDED
Status: DISCONTINUED | OUTPATIENT
Start: 2017-06-01 | End: 2017-06-01 | Stop reason: HOSPADM

## 2017-06-01 RX ORDER — ONDANSETRON 2 MG/ML
4 INJECTION INTRAMUSCULAR; INTRAVENOUS AS NEEDED
Status: DISCONTINUED | OUTPATIENT
Start: 2017-06-01 | End: 2017-06-01 | Stop reason: HOSPADM

## 2017-06-01 RX ORDER — LAMOTRIGINE 100 MG/1
100 TABLET ORAL DAILY
Status: DISCONTINUED | OUTPATIENT
Start: 2017-06-02 | End: 2017-06-02 | Stop reason: HOSPADM

## 2017-06-01 RX ORDER — HYDROMORPHONE HCL IN 0.9% NACL 15 MG/30ML
PATIENT CONTROLLED ANALGESIA VIAL INTRAVENOUS
Status: DISCONTINUED | OUTPATIENT
Start: 2017-06-01 | End: 2017-06-02 | Stop reason: HOSPADM

## 2017-06-01 RX ORDER — CEFAZOLIN SODIUM IN 0.9 % NACL 2 G/50 ML
2 INTRAVENOUS SOLUTION, PIGGYBACK (ML) INTRAVENOUS EVERY 8 HOURS
Status: COMPLETED | OUTPATIENT
Start: 2017-06-01 | End: 2017-06-02

## 2017-06-01 RX ORDER — LIDOCAINE HYDROCHLORIDE 20 MG/ML
INJECTION, SOLUTION EPIDURAL; INFILTRATION; INTRACAUDAL; PERINEURAL AS NEEDED
Status: DISCONTINUED | OUTPATIENT
Start: 2017-06-01 | End: 2017-06-01 | Stop reason: HOSPADM

## 2017-06-01 RX ORDER — SODIUM CHLORIDE 9 MG/ML
50 INJECTION, SOLUTION INTRAVENOUS CONTINUOUS
Status: DISPENSED | OUTPATIENT
Start: 2017-06-01 | End: 2017-06-02

## 2017-06-01 RX ORDER — KETAMINE HYDROCHLORIDE 10 MG/ML
INJECTION, SOLUTION INTRAMUSCULAR; INTRAVENOUS AS NEEDED
Status: DISCONTINUED | OUTPATIENT
Start: 2017-06-01 | End: 2017-06-01 | Stop reason: HOSPADM

## 2017-06-01 RX ORDER — MIDAZOLAM HYDROCHLORIDE 1 MG/ML
0.5 INJECTION, SOLUTION INTRAMUSCULAR; INTRAVENOUS
Status: DISCONTINUED | OUTPATIENT
Start: 2017-06-01 | End: 2017-06-01 | Stop reason: HOSPADM

## 2017-06-01 RX ORDER — BUPIVACAINE HYDROCHLORIDE AND EPINEPHRINE 5; 5 MG/ML; UG/ML
INJECTION, SOLUTION EPIDURAL; INTRACAUDAL; PERINEURAL AS NEEDED
Status: DISCONTINUED | OUTPATIENT
Start: 2017-06-01 | End: 2017-06-01 | Stop reason: HOSPADM

## 2017-06-01 RX ORDER — MIDAZOLAM HYDROCHLORIDE 1 MG/ML
INJECTION, SOLUTION INTRAMUSCULAR; INTRAVENOUS AS NEEDED
Status: DISCONTINUED | OUTPATIENT
Start: 2017-06-01 | End: 2017-06-01 | Stop reason: HOSPADM

## 2017-06-01 RX ORDER — GLYCOPYRROLATE 0.2 MG/ML
INJECTION INTRAMUSCULAR; INTRAVENOUS AS NEEDED
Status: DISCONTINUED | OUTPATIENT
Start: 2017-06-01 | End: 2017-06-01 | Stop reason: HOSPADM

## 2017-06-01 RX ADMIN — FENTANYL CITRATE 50 MCG: 50 INJECTION, SOLUTION INTRAMUSCULAR; INTRAVENOUS at 12:03

## 2017-06-01 RX ADMIN — FENTANYL CITRATE 100 MCG: 50 INJECTION, SOLUTION INTRAMUSCULAR; INTRAVENOUS at 12:38

## 2017-06-01 RX ADMIN — QUETIAPINE FUMARATE 75 MG: 25 TABLET, FILM COATED ORAL at 22:24

## 2017-06-01 RX ADMIN — Medication: at 15:54

## 2017-06-01 RX ADMIN — DEXAMETHASONE SODIUM PHOSPHATE 10 MG: 4 INJECTION, SOLUTION INTRA-ARTICULAR; INTRALESIONAL; INTRAMUSCULAR; INTRAVENOUS; SOFT TISSUE at 12:57

## 2017-06-01 RX ADMIN — ONDANSETRON 4 MG: 2 INJECTION INTRAMUSCULAR; INTRAVENOUS at 14:44

## 2017-06-01 RX ADMIN — LIDOCAINE HYDROCHLORIDE 100 MG: 20 INJECTION, SOLUTION EPIDURAL; INFILTRATION; INTRACAUDAL; PERINEURAL at 12:38

## 2017-06-01 RX ADMIN — SODIUM CHLORIDE, SODIUM LACTATE, POTASSIUM CHLORIDE, CALCIUM CHLORIDE: 600; 310; 30; 20 INJECTION, SOLUTION INTRAVENOUS at 12:27

## 2017-06-01 RX ADMIN — SODIUM CHLORIDE 50 ML/HR: 900 INJECTION, SOLUTION INTRAVENOUS at 16:02

## 2017-06-01 RX ADMIN — SUCCINYLCHOLINE CHLORIDE 60 MG: 20 INJECTION INTRAMUSCULAR; INTRAVENOUS at 12:42

## 2017-06-01 RX ADMIN — CEFAZOLIN 2 G: 1 INJECTION, POWDER, FOR SOLUTION INTRAMUSCULAR; INTRAVENOUS; PARENTERAL at 20:51

## 2017-06-01 RX ADMIN — DEXMEDETOMIDINE HYDROCHLORIDE 20 MCG: 4 INJECTION, SOLUTION INTRAVENOUS at 13:20

## 2017-06-01 RX ADMIN — MIDAZOLAM HYDROCHLORIDE 1 MG: 1 INJECTION, SOLUTION INTRAMUSCULAR; INTRAVENOUS at 12:37

## 2017-06-01 RX ADMIN — MIDAZOLAM HYDROCHLORIDE 2 MG: 1 INJECTION, SOLUTION INTRAMUSCULAR; INTRAVENOUS at 12:27

## 2017-06-01 RX ADMIN — NEOSTIGMINE METHYLSULFATE 3 MG: 1 INJECTION INTRAVENOUS at 14:45

## 2017-06-01 RX ADMIN — MIDAZOLAM HYDROCHLORIDE 2 MG: 1 INJECTION, SOLUTION INTRAMUSCULAR; INTRAVENOUS at 12:02

## 2017-06-01 RX ADMIN — PROPOFOL 50 MG: 10 INJECTION, EMULSION INTRAVENOUS at 12:42

## 2017-06-01 RX ADMIN — SODIUM CHLORIDE, SODIUM LACTATE, POTASSIUM CHLORIDE, CALCIUM CHLORIDE: 600; 310; 30; 20 INJECTION, SOLUTION INTRAVENOUS at 14:00

## 2017-06-01 RX ADMIN — CEFAZOLIN 2 G: 1 INJECTION, POWDER, FOR SOLUTION INTRAMUSCULAR; INTRAVENOUS; PARENTERAL at 12:49

## 2017-06-01 RX ADMIN — PROPOFOL 150 MG: 10 INJECTION, EMULSION INTRAVENOUS at 12:38

## 2017-06-01 RX ADMIN — SUCCINYLCHOLINE CHLORIDE 140 MG: 20 INJECTION INTRAMUSCULAR; INTRAVENOUS at 12:38

## 2017-06-01 RX ADMIN — ROCURONIUM BROMIDE 5 MG: 10 INJECTION, SOLUTION INTRAVENOUS at 12:38

## 2017-06-01 RX ADMIN — KETAMINE HYDROCHLORIDE 30 MG: 10 INJECTION, SOLUTION INTRAMUSCULAR; INTRAVENOUS at 13:20

## 2017-06-01 RX ADMIN — BENZOCAINE AND MENTHOL 1 LOZENGE: 15; 3.6 LOZENGE ORAL at 19:47

## 2017-06-01 RX ADMIN — LIDOCAINE HYDROCHLORIDE ANHYDROUS AND DEXTROSE MONOHYDRATE 1 MG/KG/HR: .8; 5 INJECTION, SOLUTION INTRAVENOUS at 12:55

## 2017-06-01 RX ADMIN — GLYCOPYRROLATE 0.5 MG: 0.2 INJECTION INTRAMUSCULAR; INTRAVENOUS at 14:45

## 2017-06-01 RX ADMIN — ROCURONIUM BROMIDE 35 MG: 10 INJECTION, SOLUTION INTRAVENOUS at 12:45

## 2017-06-01 RX ADMIN — ACETAMINOPHEN 650 MG: 325 TABLET, FILM COATED ORAL at 19:47

## 2017-06-01 NOTE — PERIOP NOTES
TRANSFER - OUT REPORT:    Verbal report given to yina (name) on Maribel Last  being transferred to 567(unit) for routine post - op       Report consisted of patients Situation, Background, Assessment and   Recommendations(SBAR). Time Pre op antibiotic given:2 grams ancef 2277  Anesthesia Stop time: 7173  Cazares Present on Transfer to floor:y  Order for Cazares on Chart:y    Information from the following report(s) SBAR, OR Summary, Procedure Summary, Intake/Output, MAR, Recent Results and Cardiac Rhythm nsr was reviewed with the receiving nurse. Opportunity for questions and clarification was provided. Is the patient on 02? YES       L/Min 3       Other     Is the patient on a monitor? NO    Is the nurse transporting with the patient? NO    Surgical Waiting Area notified of patient's transfer from PACU?  YES      The following personal items collected during your admission accompanied patient upon transfer:   Dental Appliance: Dental Appliances: Lowers, Uppers, At home  Vision:    Hearing Aid:    Jewelry:    Clothing: Clothing: Other (comment) (clothing bag to pacu)  Other Valuables:    Valuables sent to safe:      Clothing bag x 1 on stretcher

## 2017-06-01 NOTE — ANESTHESIA PREPROCEDURE EVALUATION
Anesthetic History No history of anesthetic complications Review of Systems / Medical History Patient summary reviewed, nursing notes reviewed and pertinent labs reviewed Pulmonary Within defined limits Neuro/Psych  
 
seizures Cardiovascular Within defined limits GI/Hepatic/Renal 
Within defined limits Endo/Other Within defined limits Other Findings Physical Exam 
 
Airway Mallampati: II 
TM Distance: > 6 cm Neck ROM: normal range of motion Mouth opening: Normal 
 
 Cardiovascular Regular rate and rhythm,  S1 and S2 normal,  no murmur, click, rub, or gallop Dental 
No notable dental hx Pulmonary Breath sounds clear to auscultation Abdominal 
GI exam deferred Other Findings Anesthetic Plan ASA: 3 Anesthesia type: general 
 
 
 
 
Induction: Intravenous Anesthetic plan and risks discussed with: Patient

## 2017-06-01 NOTE — BRIEF OP NOTE
BRIEF OPERATIVE NOTE    Date of Procedure: 6/1/2017   Preoperative Diagnosis: CHRONIC CAPSULITIS   SCAR SUBTALAR JOINT RIGHT FOOT   Postoperative Diagnosis: CHRONIC CAPSULITIS   SCAR SUBTALAR JOINT RIGHT       Procedure(s):  SUBTALAR JOINT FUSION RIGHT FOOT   Surgeon(s) and Role:     * Kristian Baig DPM - Primary         Assistant Staff:       Surgical Staff:  Circ-1: Sierra Madison RN  Circ-Relief: Enmanuelheraclio Cruz  Scrub RN-1: Ruben Godfrey RN  Event Time In   Incision Start 1322   Incision Close 1450     Anesthesia: General   Estimated Blood Loss: 20cc  Specimens: * No specimens in log *   Findings: fibrosis and DJD of the STJ   Complications: none  Implants:   Implant Name Type Inv.  Item Serial No.  Lot No. LRB No. Used Action   GRAFT BNE VIVEX DBM PLUS 3ML --  - [id number]  GRAFT BNE VIVEX DBM PLUS 3ML --  [id number] [employer] N/A Right 1 Implanted   6.5 X 70 MM SCREW   N/A [employer]RA N/A Right 1 Implanted   6.5 X 80MM SCREW     N/A [employer] N/A Right 1 Implanted

## 2017-06-01 NOTE — IP AVS SNAPSHOT
Current Discharge Medication List  
  
START taking these medications Dose & Instructions Dispensing Information Comments Morning Noon Evening Bedtime  
 morphine IR 15 mg tablet Commonly known as:  MS IR Your last dose was: Your next dose is:    
   
   
 Dose:  15 mg Take 1 Tab by mouth every four (4) hours as needed for Pain. Max Daily Amount: 90 mg. Indications: Pain Quantity:  45 Tab Refills:  0  
     
   
   
   
  
 * warfarin 1 mg tablet Commonly known as:  COUMADIN Your last dose was: Your next dose is:    
   
   
 Dose:  4 mg Take 4 Tabs by mouth daily. Indications: DEEP VEIN THROMBOSIS PREVENTION Quantity:  30 Tab Refills:  2  
     
   
   
   
  
 * warfarin 4 mg tablet Commonly known as:  COUMADIN Your last dose was: Your next dose is:    
   
   
 Dose:  4 mg Take 1 Tab by mouth daily. Indications: DEEP VEIN THROMBOSIS PREVENTION Quantity:  30 Tab Refills:  2  
     
   
   
   
  
 * Notice: This list has 2 medication(s) that are the same as other medications prescribed for you. Read the directions carefully, and ask your doctor or other care provider to review them with you. CONTINUE these medications which have CHANGED Dose & Instructions Dispensing Information Comments Morning Noon Evening Bedtime  
 promethazine 25 mg tablet Commonly known as:  PHENERGAN What changed:  when to take this Your last dose was: Your next dose is:    
   
   
 Dose:  25 mg Take 1 Tab by mouth every six (6) hours as needed for Nausea. Indications: POST-OPERATIVE NAUSEA AND VOMITING Quantity:  25 Tab Refills:  0 CONTINUE these medications which have NOT CHANGED Dose & Instructions Dispensing Information Comments Morning Noon Evening Bedtime  
 lamoTRIgine 100 mg tablet Commonly known as: LaMICtal  
   
Your last dose was: Your next dose is: Dose:  100 mg Take 100 mg by mouth daily. Refills:  0 QUEtiapine 25 mg tablet Commonly known as:  SEROquel Your last dose was: Your next dose is:    
   
   
 Dose:  75 mg Take 75 mg by mouth nightly. TAKES 3 25MG TABS EVERY BEDTIME Refills:  0  
     
   
   
   
  
 TRINTELLIX 10 mg tablet Generic drug:  vortioxetine Your last dose was: Your next dose is:    
   
   
 Dose:  20 mg Take 20 mg by mouth daily. Refills:  0 STOP taking these medications PERCOCET  mg per tablet Generic drug:  oxyCODONE-acetaminophen Where to Get Your Medications Information on where to get these meds will be given to you by the nurse or doctor. ! Ask your nurse or doctor about these medications  
  morphine IR 15 mg tablet  
 promethazine 25 mg tablet  
 warfarin 1 mg tablet  
 warfarin 4 mg tablet

## 2017-06-01 NOTE — OP NOTES
[address] [facility] [address], [address]   OP NOTE       Name:  Leti Delgado   MR#:  [id number]   :  1965   Account #:  [de-identified]    Surgery Date:  2017   Date of Adm:  2017       SURGEON: Diamante Quiros DPM    PREOPERATIVE DIAGNOSIS:  Fibrosis of the right subtalar joint. POSTOPERATIVE DIAGNOSIS:  Fibrosis and arthritis of the right   subtalar joint. PROCEDURES PERFORMED:  Fusion of the right subtalar joint. ESTIMATED BLOOD LOSS: 20 mL. SPECIMENS REMOVED: Pathology was none. ANESTHESIA:  General.    COMPLICATIONS: None. INDICATIONS: The patient has chronic pain of the right subtalar joint. We scoped her in the past, did not provide her much relief. She has   continued complaints of pain and when I block her, she numbs up and   has no pain for temporary periods of time. We discussed the surgery,   the risks, the complications and the alternatives to the procedure, and   she is aware and agreeable. DESCRIPTION OF PROCEDURE: She was taken to the operating   room and placed on the operating table in the supine position. After   adequate induction of general anesthesia, the patient was blocked with   0.5% Marcaine with epinephrine. Attention was directed towards the   right subtalar joint, where an incision was made over the right subtalar   joint and carried down to the level of the superficial fascia, which was   peeled back. The capsule was entered and the subtalar joint was   entered. I cut the interosseous ligament and then pried open the joint. I used a baby laminar  to open up the joint and debrided of   cartilage of the posterior facet and middle facet with the use of curved   osteotomes in a snow pile effect. Once the cartilage was removed, I   removed the subchondral bone plate to healthy bleeding bone with the   use of curettes, oval burrs and acorn burrs.  I had healthy bleeding bone   on both sides of the subtalar joint, as well as on the middle facet. At   this time, I used an osteotome and mallet to fish scale the joint   surfaces as well. When I was debriding the joint, specifically the middle   facet, several hunks of loose pieces of bone came out of the middle   facet. The joint itself was hard to open up and I had to pry it open, as I   stated, with osteotomes because of the fibrosis which was scarred   down and would not open up, but the middle facet appeared to be   rather degenerative. Once this was all debrided and the joints were   prepped, 2 screws were placed from the heel across the subtalar joint,   1 in the posterior facet, 1 across the middle facet. Once both screws   were down and tightened down and prior to doing this, I placed   demineralized bone matrix, 3 mL in the joint. I then closed down the   joint with the 0.065 screws. Once both screws were tightened down,   the area was lavaged. The deep structures were reapproximated with   3-0 Vicryl and the skin was reapproximated with a 4-0 nylon. She was   placed in a dry sterile dressing, followed by a posterior splint. She left   the operating room to the recovery room in stable condition.         [facility]   D:  06/01/2017   15:38   T:  06/01/2017   16:11   Job #:  [id number]

## 2017-06-01 NOTE — ANESTHESIA PROCEDURE NOTES
Peripheral Block Start time: 6/1/2017 11:54 AM 
End time: 6/1/2017 11:59 AM 
Performed by: Dennis Chester Authorized by: Dennis Chester  
 
 
Pre-procedure: Indications: at surgeon's request, post-op pain management and procedure for pain Preanesthetic Checklist: patient identified, risks and benefits discussed, site marked, timeout performed and patient being monitored Block Type:  
Block Type:  Popliteal 
Laterality:  Right Monitoring:  Standard ASA monitoring, continuous pulse ox, frequent vital sign checks, heart rate, responsive to questions and oxygen Injection Technique:  Single shot Procedures: ultrasound guided and nerve stimulator Patient Position: supine Prep: betadine and povidone-iodine 7.5% surgical scrub Location:  Lower thigh Needle Type:  Stimuplex Needle Gauge:  22 G Needle Localization:  Nerve stimulator and ultrasound guidance Motor Response: minimal motor response >0.4 mA Medication Injected:  0.5% 
ropivacaine Volume (mL):  30 
 
Assessment: 
Number of attempts:  1 Injection Assessment:  Incremental injection every 5 mL, local visualized surrounding nerve on ultrasound, negative aspiration for CSF, negative aspiration for blood, no paresthesia, no intravascular symptoms and ultrasound image on chart Patient tolerance:  Patient tolerated the procedure well with no immediate complications

## 2017-06-01 NOTE — ROUTINE PROCESS
Primary Nurse Michel Ferrer RN and Nieves Marie RN performed a dual skin assessment on this patient Impairment noted- see wound doc flow sheet. Patient has surgical incision on right foot covered by dressing. Patient also has scratch on left arm from a fence.   Landen score is 20

## 2017-06-01 NOTE — ROUTINE PROCESS
TRANSFER - IN REPORT:    Verbal report received from Mal Segundo RN(name) on Chanel Betancourt  being received from [employer] software) for routine progression of care      Report consisted of patients Situation, Background, Assessment and   Recommendations(SBAR). Information from the following report(s) SBAR, Kardex, Intake/Output, MAR and Recent Results was reviewed with the receiving nurse. Opportunity for questions and clarification was provided. 1722  Assessment completed upon patients arrival to unit and care assumed.

## 2017-06-01 NOTE — IP AVS SNAPSHOT
[address] 
[phone] Patient: Bianca Greenberg MRN: [id number] :1965 You are allergic to the following Allergen Reactions Latex Itching Lyrica (Pregabalin) Other (comments) \" affects mood/aggressive\" Recent Documentation Height Weight BMI OB Status Smoking Status 1.626 m 91.2 kg 34.5 kg/m2 Postmenopausal Former Smoker Emergency Contacts Name Discharge Info Relation Home Work Mobile [address] CAREGIVER [3] Spouse [3] [phone] About your hospitalization You were [facility] on:  2017 [facility]  [facility] Unit phone number:  [phone] Why you were hospitalized Your primary diagnosis was:  Not on File Providers Seen During Your Hospitalizations Provider Role Specialty Primary office phone Clenton Rubinstein, DPM Attending Provider Podiatry [phone] Your Primary Care Physician (PCP) Primary Care Physician Office Phone Office Fax Daisy Kim [phone] Follow-up Information Follow up With Details Comments Contact Info [place]   [address] [address] [place] [address] [phone]  [facility], please call office if you do not hear from staff member  [address] [place] [id number] 
[phone] Current Discharge Medication List  
  
START taking these medications Dose & Instructions Dispensing Information Comments Morning Noon Evening Bedtime  
 morphine IR 15 mg tablet Commonly known as:  MS IR Your last dose was: Your next dose is:    
   
   
 Dose:  15 mg Take 1 Tab by mouth every four (4) hours as needed for Pain. Max Daily Amount: 90 mg. Indications: Pain Quantity:  45 Tab Refills:  0 * warfarin 1 mg tablet Commonly known as:  COUMADIN Your last dose was: Your next dose is:    
   
   
 Dose:  4 mg Take 4 Tabs by mouth daily. Indications: DEEP VEIN THROMBOSIS PREVENTION Quantity:  30 Tab Refills:  2  
     
   
   
   
  
 * warfarin 4 mg tablet Commonly known as:  COUMADIN Your last dose was: Your next dose is:    
   
   
 Dose:  4 mg Take 1 Tab by mouth daily. Indications: DEEP VEIN THROMBOSIS PREVENTION Quantity:  30 Tab Refills:  2  
     
   
   
   
  
 * Notice: This list has 2 medication(s) that are the same as other medications prescribed for you. Read the directions carefully, and ask your doctor or other care provider to review them with you. CONTINUE these medications which have CHANGED Dose & Instructions Dispensing Information Comments Morning Noon Evening Bedtime  
 promethazine 25 mg tablet Commonly known as:  PHENERGAN What changed:  when to take this Your last dose was: Your next dose is:    
   
   
 Dose:  25 mg Take 1 Tab by mouth every six (6) hours as needed for Nausea. Indications: POST-OPERATIVE NAUSEA AND VOMITING Quantity:  25 Tab Refills:  0 CONTINUE these medications which have NOT CHANGED Dose & Instructions Dispensing Information Comments Morning Noon Evening Bedtime  
 lamoTRIgine 100 mg tablet Commonly known as: LaMICtal  
   
Your last dose was: Your next dose is:    
   
   
 Dose:  100 mg Take 100 mg by mouth daily. Refills:  0 QUEtiapine 25 mg tablet Commonly known as:  SEROquel Your last dose was: Your next dose is:    
   
   
 Dose:  75 mg Take 75 mg by mouth nightly. TAKES 3 25MG TABS EVERY BEDTIME Refills:  0  
     
   
   
   
  
 TRINTELLIX 10 mg tablet Generic drug:  vortioxetine Your last dose was: Your next dose is: Dose:  20 mg Take 20 mg by mouth daily. Refills:  0 STOP taking these medications PERCOCET  mg per tablet Generic drug:  oxyCODONE-acetaminophen Where to Get Your Medications Information on where to get these meds will be given to you by the nurse or doctor. ! Ask your nurse or doctor about these medications  
  morphine IR 15 mg tablet  
 promethazine 25 mg tablet  
 warfarin 1 mg tablet  
 warfarin 4 mg tablet Discharge Instructions INSTRUCTIONS FOLLOWING FOOT SURGERY 
 
ACTIVITY: 
· Elevate feet for 48 hours · Use ice as directed by your doctor · Use crutches / walker as directed by your doctor, and follow your doctors instructions as to your weight bearing status - NO WEIGHT ON THE RIGHT SIDE DIET: 
· Clear liquids until no nausea or vomiting; then light diet for the first day · An advance to regular diet on second day, unless your doctor orders otherwise. PAIN: 
· Take pain medication as directed by your doctor. · Call your doctor if pain is not relieved by medication. · Do not take aspirin or blood thinners until directed by your doctor. DRESSING CARE: keep dressing clean and dry, do not remove FOLLOW-UP PHONE CALLS: 
· Calls will be made by nursing staff. · If you had any problems, call your doctor as needed. CALL YOUR DOCTOR IF: 
· Excessive bleeding that does not stop after holding mild pressure over the area · Temperature of 101° F or above · Redness, excessive swelling or bruising, and/or green or yellow, smelly discharge from incision · Loss of sensation cold, white, or blue toes AFTER ANESTHESIA :  
· For the first 24 hours: do not drive, drink alcoholic beverages, or make important decisions. · Be aware of dizziness following anesthesia and while taking pain medication. DISCHARGE MEDICATIONS:  
   
 
APPOINTMENT DATE/TIME: please call for an appointment  In one week.  
 
YOUR [address]  PHONE NUMBER: [phone] Patients signature: 
Date: 2017 Discharging Nurse: Florence Activation Thank you for requesting access to [employer]. Please follow the instructions below to securely access and download your online medical record. [employer] allows you to send messages to your doctor, view your test results, renew your prescriptions, schedule appointments, and more. How Do I Sign Up? 1. In your internet browser, go to [email or web address] 
2. Click on the First Time User? Click Here link in the Sign In box. You will be redirect to the New Member Sign Up page. 3. Enter your [employer] Access Code exactly as it appears below. You will not need to use this code after youve completed the sign-up process. If you do not sign up before the expiration date, you must request a new code. [employer] Access Code: Activation code not generated Current [employer] Status: Active (This is the date your [employer] access code will ) 4. Enter the last four digits of your Social Security Number (xxxx) and Date of Birth (mm/dd/yyyy) as indicated and click Submit. You will be taken to the next sign-up page. 5. Create a [employer] ID. This will be your [employer] login ID and cannot be changed, so think of one that is secure and easy to remember. 6. Create a [employer] password. You can change your password at any time. 7. Enter your Password Reset Question and Answer. This can be used at a later time if you forget your password. 8. Enter your e-mail address. You will receive e-mail notification when new information is available in [address]. 9. Click Sign Up. You can now view and download portions of your medical record. 10. Click the Download Summary menu link to download a portable copy of your medical information. Additional Information If you have questions, please visit the Frequently Asked Questions section of the [employer] website at [email or web address] Remember, MyChart is NOT to be used for urgent needs. For medical emergencies, dial 911. Discharge Orders None Introducing \[facility]\"" & HEALTH SERVICES! Dear Marissa Ricardo: Thank you for requesting a [employer] account. Our records indicate that you already have an active [employer] account. You can access your account anytime at [email or web address] Did you know that you can access your hospital and ER discharge instructions at any time in [employer]? You can also review all of your test results from your hospital stay or ER visit. Additional Information If you have questions, please visit the Frequently Asked Questions section of the [employer] website at [email or web address] Remember, MyChart is NOT to be used for urgent needs. For medical emergencies, dial 911. Now available from your iPhone and Android! General Information Please provide this summary of care documentation to your next provider. Patient Signature:  ____________________________________________________________ Date:  ____________________________________________________________  
  
Tacos Cherry Provider Signature:  ____________________________________________________________ Date:  ____________________________________________________________

## 2017-06-01 NOTE — ANESTHESIA POSTPROCEDURE EVALUATION
Post-Anesthesia Evaluation and Assessment Patient: Maribel Brian MRN: [id number]  SSN: [id number] YOB: 1965  Age: 45 y.o. Sex: female Cardiovascular Function/Vital Signs Visit Vitals  /56  Pulse 77  Temp 36.4 °C (97.6 °F)  Resp 18  Ht 5' 4\" (1.626 m)  Wt 91.2 kg (201 lb)  SpO2 98%  BMI 34.5 kg/m2 Patient is status post general anesthesia for Procedure(s): SUBTAYLOR JOINT FUSION RIGHT FOOT . Nausea/Vomiting: None Postoperative hydration reviewed and adequate. Pain: 
Pain Scale 1: Numeric (0 - 10) (06/01/17 1600) Pain Intensity 1: 0 (06/01/17 1600) Managed Neurological Status:  
Neuro (WDL): Within Defined Limits (06/01/17 1609) Neuro Neurologic State: Alert (06/01/17 1609) LUE Motor Response: Purposeful (06/01/17 1609) LLE Motor Response: Purposeful (06/01/17 1609) RUE Motor Response: Purposeful (06/01/17 1609) RLE Motor Response: Purposeful (06/01/17 1609) At baseline Mental Status and Level of Consciousness: Alert and oriented Pulmonary Status:  
O2 Device: Nasal cannula (06/01/17 1606) Adequate oxygenation and airway patent Complications related to anesthesia: None Post-anesthesia assessment completed. No concerns Signed By: Kenzie Cisse DO   
 June 1, 2017

## 2017-06-01 NOTE — DISCHARGE INSTRUCTIONS
INSTRUCTIONS FOLLOWING FOOT SURGERY    ACTIVITY:  · Elevate feet for 48 hours  · Use ice as directed by your doctor  · Use crutches / walker as directed by your doctor, and follow your doctors instructions as to your weight bearing status - NO WEIGHT ON THE RIGHT SIDE    DIET:  · Clear liquids until no nausea or vomiting; then light diet for the first day  · An advance to regular diet on second day, unless your doctor orders otherwise. PAIN:  · Take pain medication as directed by your doctor. · Call your doctor if pain is not relieved by medication. · Do not take aspirin or blood thinners until directed by your doctor. DRESSING CARE: keep dressing clean and dry, do not remove       FOLLOW-UP PHONE CALLS:  · Calls will be made by nursing staff. · If you had any problems, call your doctor as needed. CALL YOUR DOCTOR IF:  · Excessive bleeding that does not stop after holding mild pressure over the area  · Temperature of 101° F or above  · Redness, excessive swelling or bruising, and/or green or yellow, smelly discharge from incision  · Loss of sensation -cold, white, or blue toes    AFTER ANESTHESIA :   · For the first 24 hours: do not drive, drink alcoholic beverages, or make important decisions. · Be aware of dizziness following anesthesia and while taking pain medication. DISCHARGE MEDICATIONS:         APPOINTMENT DATE/TIME: please call for an appointment  In one week. YOUR DOCTORS PHONE NUMBER: [phone]    Patients signature:  Date: 6/1/2017  Discharging Nurse:       Hua Parra    Thank you for requesting access to [employer]. Please follow the instructions below to securely access and download your online medical record. [employer] allows you to send messages to your doctor, view your test results, renew your prescriptions, schedule appointments, and more. How Do I Sign Up? 1. In your internet browser, go to [email or web address]  2. Click on the First Time User?  Click Here link in the Sign In box. You will be redirect to the New Member Sign Up page. 3. Enter your Issue Access Code exactly as it appears below. You will not need to use this code after youve completed the sign-up process. If you do not sign up before the expiration date, you must request a new code. [employer] Access Code: Activation code not generated  Current Issue Status: Active (This is the date your [employer] access code will )    4. Enter the last four digits of your Social Security Number (xxxx) and Date of Birth (mm/dd/yyyy) as indicated and click Submit. You will be taken to the next sign-up page. 5. Create a [employer] ID. This will be your Issue login ID and cannot be changed, so think of one that is secure and easy to remember. 6. Create a Issue password. You can change your password at any time. 7. Enter your Password Reset Question and Answer. This can be used at a later time if you forget your password. 8. Enter your e-mail address. You will receive e-mail notification when new information is available in [address] 9. Click Sign Up. You can now view and download portions of your medical record. 10. Click the Download Summary menu link to download a portable copy of your medical information. Additional Information    If you have questions, please visit the Frequently Asked Questions section of the Issue website at [email or web address] Remember, Issue is NOT to be used for urgent needs. For medical emergencies, dial 911.

## 2017-06-01 NOTE — ROUTINE PROCESS
Patient: Melo Lewis MRN: [id number]  SSN: [id number]   YOB: 1965  Age: 45 y.o. Sex: female     Patient is status post Procedure(s):  SUBTAYLOR JOINT FUSION RIGHT FOOT .     Surgeon(s) and Role:     * Saman Sullivan DPM - Primary    Local/Dose/Irrigation:  Marcaine 0.5% with epi 10 ml                  Peripheral IV 06/01/17 Right Arm (Active)                           Dressing/Packing:  Wound Foot Right;Lateral-DRESSING TYPE: 4 x 4;Cast padding;Gauze wrap (toi) (06/01/17 1509)  Wound Heel Right-DRESSING TYPE: 4 x 4;Cast padding;Gauze wrap (toi) (06/01/17 1509)  Splint/Cast: Wound Foot Right;Lateral-SPLINT TYPE/MATERIAL: Splint, fiberglass  Wound Heel Right-SPLINT TYPE/MATERIAL: Splint, fiberglass]    Other:  mary

## 2017-06-02 VITALS
WEIGHT: 201 LBS | SYSTOLIC BLOOD PRESSURE: 95 MMHG | TEMPERATURE: 98.2 F | HEART RATE: 78 BPM | HEIGHT: 64 IN | BODY MASS INDEX: 34.31 KG/M2 | DIASTOLIC BLOOD PRESSURE: 62 MMHG | OXYGEN SATURATION: 91 % | RESPIRATION RATE: 16 BRPM

## 2017-06-02 LAB
BASOPHILS # BLD AUTO: 0 K/UL (ref 0–0.1)
BASOPHILS # BLD: 0 % (ref 0–1)
EOSINOPHIL # BLD: 0 K/UL (ref 0–0.4)
EOSINOPHIL NFR BLD: 0 % (ref 0–7)
ERYTHROCYTE [DISTWIDTH] IN BLOOD BY AUTOMATED COUNT: 13.4 % (ref 11.5–14.5)
HCT VFR BLD AUTO: 35.1 % (ref 35–47)
HGB BLD-MCNC: 11.7 G/DL (ref 11.5–16)
INR PPP: 1 (ref 0.9–1.1)
LYMPHOCYTES # BLD AUTO: 14 % (ref 12–49)
LYMPHOCYTES # BLD: 2 K/UL (ref 0.8–3.5)
MCH RBC QN AUTO: 30.2 PG (ref 26–34)
MCHC RBC AUTO-ENTMCNC: 33.3 G/DL (ref 30–36.5)
MCV RBC AUTO: 90.5 FL (ref 80–99)
MONOCYTES # BLD: 1 K/UL (ref 0–1)
MONOCYTES NFR BLD AUTO: 7 % (ref 5–13)
NEUTS SEG # BLD: 11.5 K/UL (ref 1.8–8)
NEUTS SEG NFR BLD AUTO: 79 % (ref 32–75)
PLATELET # BLD AUTO: 238 K/UL (ref 150–400)
PROTHROMBIN TIME: 10.7 SEC (ref 9–11.1)
RBC # BLD AUTO: 3.88 M/UL (ref 3.8–5.2)
WBC # BLD AUTO: 14.5 K/UL (ref 3.6–11)

## 2017-06-02 PROCEDURE — 74011250637 HC RX REV CODE- 250/637: Performed by: PODIATRIST

## 2017-06-02 PROCEDURE — 36415 COLL VENOUS BLD VENIPUNCTURE: CPT | Performed by: PODIATRIST

## 2017-06-02 PROCEDURE — 99218 HC RM OBSERVATION: CPT

## 2017-06-02 PROCEDURE — 74011250636 HC RX REV CODE- 250/636: Performed by: PODIATRIST

## 2017-06-02 PROCEDURE — 97161 PT EVAL LOW COMPLEX 20 MIN: CPT

## 2017-06-02 PROCEDURE — 97530 THERAPEUTIC ACTIVITIES: CPT

## 2017-06-02 PROCEDURE — 85610 PROTHROMBIN TIME: CPT | Performed by: PODIATRIST

## 2017-06-02 PROCEDURE — 77010033678 HC OXYGEN DAILY

## 2017-06-02 PROCEDURE — 85025 COMPLETE CBC W/AUTO DIFF WBC: CPT | Performed by: PODIATRIST

## 2017-06-02 RX ORDER — WARFARIN 1 MG/1
4 TABLET ORAL DAILY
Qty: 30 TAB | Refills: 2 | Status: SHIPPED | OUTPATIENT
Start: 2017-06-02 | End: 2018-03-26

## 2017-06-02 RX ORDER — WARFARIN 4 MG/1
4 TABLET ORAL DAILY
Qty: 30 TAB | Refills: 2 | Status: SHIPPED | OUTPATIENT
Start: 2017-06-02 | End: 2018-03-26

## 2017-06-02 RX ORDER — MORPHINE SULFATE 15 MG/1
15 TABLET ORAL
Qty: 45 TAB | Refills: 0 | Status: SHIPPED | OUTPATIENT
Start: 2017-06-02

## 2017-06-02 RX ORDER — PROMETHAZINE HYDROCHLORIDE 25 MG/1
25 TABLET ORAL
Qty: 25 TAB | Refills: 0 | Status: SHIPPED | OUTPATIENT
Start: 2017-06-02

## 2017-06-02 RX ORDER — MORPHINE SULFATE 15 MG/1
15 TABLET ORAL ONCE
Status: COMPLETED | OUTPATIENT
Start: 2017-06-02 | End: 2017-06-02

## 2017-06-02 RX ADMIN — SODIUM CHLORIDE 50 ML/HR: 900 INJECTION, SOLUTION INTRAVENOUS at 05:12

## 2017-06-02 RX ADMIN — MORPHINE SULFATE 15 MG: 15 TABLET ORAL at 16:08

## 2017-06-02 RX ADMIN — CEFAZOLIN 2 G: 1 INJECTION, POWDER, FOR SOLUTION INTRAMUSCULAR; INTRAVENOUS; PARENTERAL at 05:12

## 2017-06-02 NOTE — PROGRESS NOTES
Problem: Surgical Pathway Day of Surgery  Goal: Respiratory  Outcome: Progressing Towards Goal  Patient using Incentive spirometer independently, coughing to clear lungs

## 2017-06-02 NOTE — PROGRESS NOTES
Bedside and Verbal shift change report given to Dionne (oncoming nurse) by Nicole Pitts RN (offgoing nurse). Report given with Ml BROOKE and MAR.

## 2017-06-02 NOTE — PROGRESS NOTES
physical Therapy EVALUATION/DISCHARGE  Patient: Minerva Osei (37 y.o. female)  Date: 6/2/2017  Primary Diagnosis: CHRONIC CAPSULITIS  SCAR SUBTALOR JOINT RIGHT FOOT   CHRONIC CAPSULITIS  SCAR SUBTALOR JOINT RIGHT FOOT   Procedure(s) (LRB):  SUBTAYLOR JOINT FUSION RIGHT FOOT  (Right) 1 Day Post-Op   Precautions:   Fall, NWB  ASSESSMENT :  Based on the objective data described below, the patient presents with good functional mobility s/p R foot surgery. Pt reports this is her 4th surgery on the foot and she is well aware of how to mobilize post op. She has all necessary equipment needs at home and was able to safely demonstrate a stand pivot transfer from bed to [facility]. Pt denies using RW as she feels more comfortable just holding on to the bed and commode. She reports when she has surgery she usually just transfers from bed to w/c and uses the w/c as main means of mobility to minimize risk of placing weight on surgical foot. She feels comfortable with all functional tasks and does not feel she needs additional skilled services. Encouraged her to be up in the chair 3x/day with nursing supervision. From a PT standpoint she is cleared to return home with spouse and has all needs. Will sign off at this time. Skilled physical therapy is not indicated at this time. PLAN :  Discharge Recommendations: Outpatient when cleared by MD to do so   Further Equipment Recommendations for Discharge: none     SUBJECTIVE:   Patient stated I have done this before. It is my 4th time. 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    Unspecified adverse effect of anesthesia     TROUBLE WAKING IN THE PAST     Past Surgical History:   Procedure Laterality Date    HX CHOLECYSTECTOMY  1999    HX GYN  2006    TUBAL PREGNANCY AFTER TUBAL LIGATION    HX GYN  2010    UTERINE POLYPS REMOVED    HX GYN  2010    THINNING OF UTERUS    HX HEENT      MOUTH/DENTAL SURGERY- UPPER AND LOWER IMPLANTS    HX ORTHOPAEDIC      REMOVED CHIP    HX ORTHOPAEDIC      CLEANED OUT SUBTAYLOR JOINT    HX ORTHOPAEDIC      FUSED COBALT JOINT    HX ORTHOPAEDIC      PLANTAR FASCIITIS     HX OTHER SURGICAL      R FOOT    HX TUBAL LIGATION  2006     Prior Level of Function/Home Situation: modified independent with use of SPC   Personal factors and/or comorbidities impacting plan of care:     Home Situation  Home Environment: Private residence  # Steps to Enter: 5  Rails to Enter: Yes  Hand Rails : Bilateral  Wheelchair Ramp: Yes  One/Two Story Residence: One story  Living Alone: No  Support Systems: Spouse/Significant Other/Partner  Patient Expects to be Discharged to[de-identified] Private residence  Current DME Used/Available at Home: Walker, rolling, Walker, rollator, Wheelchair, Wheelchair, power, Jolly Conor, straight, Shower chair    EXAMINATION/PRESENTATION/DECISION MAKING:   Critical Behavior:  Neurologic State: Alert  Orientation Level: Oriented X4  Cognition: Appropriate decision making, Appropriate for age attention/concentration, Follows commands  Safety/Judgement: Awareness of environment, Decreased awareness of need for safety  Hearing: Auditory  Auditory Impairment: Hard of hearing, left side  Skin:  Appears intact    Range Of Motion:  AROM: Within functional limits                       Strength:    Strength:  Within functional limits                    Tone & Sensation:   Tone: Normal              Sensation: Intact               Coordination:  Coordination: Within functional limits  Vision:      Functional Mobility:  Bed Mobility:     Supine to Sit: Independent  Sit to Supine: Independent     Transfers:  Sit to Stand: Modified independent  Stand to Sit: Modified independent                       Balance:   Sitting: Intact  Standing: Intact       Pain:  Pain Scale 1: Numeric (0 - 10)  Pain Intensity 1: 9  Pain Location 1: Foot  Activity Tolerance:   No apparent distress   Please refer to the flowsheet for vital signs taken during this treatment. After treatment:   []   Patient left in no apparent distress sitting up in chair  [x]   Patient left in no apparent distress in bed  [x]   Call bell left within reach  [x]   Nursing notified  []   Caregiver present  []   Bed alarm activated    COMMUNICATION/EDUCATION:   Communication/Collaboration:  [x]   Fall prevention education was provided and the patient/caregiver indicated understanding. [x]   Patient/family have participated as able and agree with findings and recommendations. []   Patient is unable to participate in plan of care at this time.   Findings and recommendations were discussed with: Registered Nurse    Thank you for this referral.  Dany Camarillo, PT   Time Calculation: 27 mins

## 2017-06-02 NOTE — PROGRESS NOTES
Progress Note    Patient: Ned Valenzuela MRN: [id number]  SSN: [id number]    YOB: 1965  Age: 45 y.o. Sex: female      Admit Date: 2017    1 Day Post-Op    Procedure:  Procedure(s):  SUBTAYLOR JOINT FUSION RIGHT FOOT     Subjective:     Patient has complaints of pain. She is controlled on pca dilaudid    Objective:     Visit Vitals    BP 95/62 (BP 1 Location: Left arm, BP Patient Position: At rest)    Pulse 78    Temp 98.2 °F (36.8 °C)    Resp 16    Ht 5' 4\" (1.626 m)    Wt 91.2 kg (201 lb)    SpO2 91%    BMI 34.5 kg/m2       Temp (24hrs), Av.6 °F (36.4 °C), Min:96.7 °F (35.9 °C), Max:98.2 °F (36.8 °C)      Physical Exam:    Toes are wram and pink   Good ROM    Data Review: images and reports reviewed    Lab Review: All lab results for the last 24 hours reviewed. Assessment:     Hospital Problems  Date Reviewed: 2017    None          Plan/Recommendations/Medical Decision Making:      Will d/c patient    Signed By: Lilia Vazquez DPM     2017

## 2017-06-02 NOTE — PROGRESS NOTES
Andi Mckay stated she has a sequential compression device at home and would like to forgo the lovenox  I will Rx coumadin,  She will use SQD

## 2017-06-02 NOTE — PROGRESS NOTES
CM noted discharge order and order for home health. CM met with pt to offer choice of HH and pt selected [facility]. Referral sent via [employer]. Cheyenne Jackson RN CRM    [id number] accepted pt for [facility].   Information added to AVS.  Cheyenne Jackson RN CRM

## 2017-06-02 NOTE — PROGRESS NOTES
CM reviewed chart. CM noted pt had subtaylor joint fusion with history of arthritis, chronic pain, GERD, seizures, multiple foot surgeries,  anxiety, and depression. CM met with pt to introduce self and role and offer support. Bedside assessment completed. CURRENT LIVING SITUATION-  Pt states she lives with her  in a private residence. Pt has not driven in about one year and gets assistance with transportation as needed. Pt uses a wheelchair, walker, and cane at home. Pt gets assistance with ADL's as needed. Pt's PCP is Lawrence Altamirano NP. CM verified with pt her insurance is [employer]. Pt does not have an AMD and declines information. DISCHARGE PLAN-  Pt denies need for assistance with medications, transportation, and follow up appointments. Disposition plan is to discharge home in care of family and self. CM will continue to follow as necessary. Melo Orozco RN CRM    Care Management Interventions  PCP Verified by CM:  Yes Orlando Reyna NP)  Palliative Care Consult (Criteria: CHF and RRAT>21): No  Mode of Transport at Discharge: Self  MyChart Signup: Yes  Physical Therapy Consult: Yes  Current Support Network: Lives with Spouse, Own Home  Confirm Follow Up Transport: Family  Plan discussed with Pt/Family/Caregiver: Yes  Discharge Location  Discharge Placement: Home with family assistance

## 2017-06-02 NOTE — PROGRESS NOTES
Attempted to call Dr. Kortney Bates because patient is feeling nauseous and does not have any nausea medicine ordered. Left message on voice mail to call unit back. In the meantime gave patient Quease Ease for aroma therapy to relieve nausea.

## 2017-06-02 NOTE — PROGRESS NOTES
DC note: Pt discharged to home with  in family car. All personal belongings with patient. All DC instructions reviewed and written instructions given. Pt given an opportunity to  To ask questions. Patient voiced undersdanding of all DC instructions.

## 2017-09-30 ENCOUNTER — HOSPITAL ENCOUNTER (OUTPATIENT)
Dept: GENERAL RADIOLOGY | Age: 52
Discharge: HOME | End: 2017-09-30

## 2017-09-30 DIAGNOSIS — M47.812 CERVICAL SPONDYLOSIS WITHOUT MYELOPATHY: ICD-10-CM

## 2017-10-06 ENCOUNTER — HOSPITAL ENCOUNTER (OUTPATIENT)
Dept: GENERAL RADIOLOGY | Age: 52
Discharge: HOME | End: 2017-10-06
Payer: COMMERCIAL

## 2017-10-06 DIAGNOSIS — M47.812 CERVICAL SPONDYLOSIS WITHOUT MYELOPATHY: ICD-10-CM

## 2017-10-06 PROCEDURE — 72040 X-RAY EXAM NECK SPINE 2-3 VW: CPT

## 2017-10-27 ENCOUNTER — HOSPITAL ENCOUNTER (OUTPATIENT)
Dept: MRI IMAGING | Age: 52
Discharge: HOME | End: 2017-10-27
Attending: PHYSICAL MEDICINE & REHABILITATION
Payer: COMMERCIAL

## 2017-10-27 DIAGNOSIS — M47.812 SPONDYLOSIS WITHOUT MYELOPATHY OR RADICULOPATHY, CERVICAL REGION: ICD-10-CM

## 2017-10-27 PROCEDURE — 72141 MRI NECK SPINE W/O DYE: CPT

## 2018-02-15 ENCOUNTER — HOSPITAL ENCOUNTER (OUTPATIENT)
Dept: GENERAL RADIOLOGY | Age: 53
Discharge: HOME | End: 2018-02-15
Payer: COMMERCIAL

## 2018-02-15 DIAGNOSIS — M25.372 LEFT ANKLE INSTABILITY: ICD-10-CM

## 2018-02-15 DIAGNOSIS — M25.373 INSTABILITY OF ANKLE: ICD-10-CM

## 2018-02-15 DIAGNOSIS — M25.371 RIGHT ANKLE INSTABILITY: ICD-10-CM

## 2018-02-15 PROCEDURE — 73610 X-RAY EXAM OF ANKLE: CPT

## 2018-02-15 PROCEDURE — 74011000250 HC RX REV CODE- 250: Performed by: PODIATRIST

## 2018-02-15 PROCEDURE — 77071 MNL APPL STRS JT RADIOGRAPHY: CPT

## 2018-02-15 PROCEDURE — 73600 X-RAY EXAM OF ANKLE: CPT

## 2018-02-15 RX ORDER — LIDOCAINE HYDROCHLORIDE 10 MG/ML
10 INJECTION, SOLUTION EPIDURAL; INFILTRATION; INTRACAUDAL; PERINEURAL
Status: COMPLETED | OUTPATIENT
Start: 2018-02-15 | End: 2018-02-15

## 2018-02-15 RX ORDER — LIDOCAINE HYDROCHLORIDE 10 MG/ML
4 INJECTION, SOLUTION EPIDURAL; INFILTRATION; INTRACAUDAL; PERINEURAL
Status: DISCONTINUED | OUTPATIENT
Start: 2018-02-15 | End: 2018-02-15 | Stop reason: SDUPTHER

## 2018-02-15 RX ORDER — LIDOCAINE HYDROCHLORIDE 10 MG/ML
10 INJECTION, SOLUTION EPIDURAL; INFILTRATION; INTRACAUDAL; PERINEURAL
Status: DISCONTINUED | OUTPATIENT
Start: 2018-02-15 | End: 2018-02-15

## 2018-02-15 RX ADMIN — LIDOCAINE HYDROCHLORIDE 10 ML: 10 INJECTION, SOLUTION EPIDURAL; INFILTRATION; INTRACAUDAL; PERINEURAL at 11:00

## 2018-03-26 RX ORDER — GLUCOSAMINE SULFATE 1500 MG
POWDER IN PACKET (EA) ORAL DAILY
COMMUNITY

## 2018-03-26 RX ORDER — TEMAZEPAM 30 MG/1
30 CAPSULE ORAL
COMMUNITY

## 2018-03-26 RX ORDER — GABAPENTIN 300 MG/1
300 CAPSULE ORAL DAILY
COMMUNITY

## 2018-03-29 ENCOUNTER — ANESTHESIA (OUTPATIENT)
Dept: SURGERY | Age: 53
End: 2018-03-29
Payer: COMMERCIAL

## 2018-03-29 ENCOUNTER — ANESTHESIA EVENT (OUTPATIENT)
Dept: SURGERY | Age: 53
End: 2018-03-29
Payer: COMMERCIAL

## 2018-03-29 ENCOUNTER — HOSPITAL ENCOUNTER (OUTPATIENT)
Age: 53
Setting detail: OBSERVATION
Discharge: HOME | End: 2018-03-30
Attending: PODIATRIST | Admitting: PODIATRIST
Payer: COMMERCIAL

## 2018-03-29 DIAGNOSIS — G89.18 POST-OP PAIN: Primary | ICD-10-CM

## 2018-03-29 PROBLEM — M24.671: Status: ACTIVE | Noted: 2018-03-29

## 2018-03-29 PROBLEM — M25.371: Status: ACTIVE | Noted: 2018-03-29

## 2018-03-29 PROCEDURE — 77030000032 HC CUF TRNQT ZIMM -B: Performed by: PODIATRIST

## 2018-03-29 PROCEDURE — 74011000250 HC RX REV CODE- 250

## 2018-03-29 PROCEDURE — 74011250636 HC RX REV CODE- 250/636

## 2018-03-29 PROCEDURE — 77030018835 HC SOL IRR LR ICUM -A: Performed by: PODIATRIST

## 2018-03-29 PROCEDURE — 77030006585 HC BLD ARTHSC BAN BD -B: Performed by: PODIATRIST

## 2018-03-29 PROCEDURE — 77030008753 HC TU IRR IN/OUT FLO S&N -B: Performed by: PODIATRIST

## 2018-03-29 PROCEDURE — 77030006898 HC BLD SHV SYNVTR S&N -B: Performed by: PODIATRIST

## 2018-03-29 PROCEDURE — 77030027835 HC WND ARTHRO COVAC S&N -D: Performed by: PODIATRIST

## 2018-03-29 PROCEDURE — 77030006884 HC BLD SHV INCIS S&N -B: Performed by: PODIATRIST

## 2018-03-29 PROCEDURE — 77030011640 HC PAD GRND REM COVD -A: Performed by: PODIATRIST

## 2018-03-29 PROCEDURE — 64450 NJX AA&/STRD OTHER PN/BRANCH: CPT

## 2018-03-29 PROCEDURE — 76210000016 HC OR PH I REC 1 TO 1.5 HR: Performed by: PODIATRIST

## 2018-03-29 PROCEDURE — 77030011930 HC WND ARTHRO ABLT S&N -C: Performed by: PODIATRIST

## 2018-03-29 PROCEDURE — 74011250636 HC RX REV CODE- 250/636: Performed by: PODIATRIST

## 2018-03-29 PROCEDURE — 77030013079 HC BLNKT BAIR HGGR 3M -A: Performed by: ANESTHESIOLOGY

## 2018-03-29 PROCEDURE — 74011250637 HC RX REV CODE- 250/637: Performed by: PODIATRIST

## 2018-03-29 PROCEDURE — C1713 ANCHOR/SCREW BN/BN,TIS/BN: HCPCS | Performed by: PODIATRIST

## 2018-03-29 PROCEDURE — 76060000040 HC ANESTHESIA 4.5 TO 5 HR: Performed by: PODIATRIST

## 2018-03-29 PROCEDURE — 99218 HC RM OBSERVATION: CPT

## 2018-03-29 PROCEDURE — 77030002933 HC SUT MCRYL J&J -A: Performed by: PODIATRIST

## 2018-03-29 PROCEDURE — 77030022922 HC BIT DRL SUT TAK KT -ARTH -C: Performed by: PODIATRIST

## 2018-03-29 PROCEDURE — 76010000136 HC OR TIME 4.5 TO 5 HR: Performed by: PODIATRIST

## 2018-03-29 PROCEDURE — 77030008684 HC TU ET CUF COVD -B: Performed by: ANESTHESIOLOGY

## 2018-03-29 PROCEDURE — 77030002916 HC SUT ETHLN J&J -A: Performed by: PODIATRIST

## 2018-03-29 PROCEDURE — 74011000250 HC RX REV CODE- 250: Performed by: PODIATRIST

## 2018-03-29 PROCEDURE — 74011250636 HC RX REV CODE- 250/636: Performed by: ANESTHESIOLOGY

## 2018-03-29 PROCEDURE — 77030031139 HC SUT VCRL2 J&J -A: Performed by: PODIATRIST

## 2018-03-29 PROCEDURE — 77030019908 HC STETH ESOPH SIMS -A: Performed by: ANESTHESIOLOGY

## 2018-03-29 PROCEDURE — 77030004451 HC BUR SHV S&N -B: Performed by: PODIATRIST

## 2018-03-29 PROCEDURE — 77030026438 HC STYL ET INTUB CARD -A: Performed by: ANESTHESIOLOGY

## 2018-03-29 RX ORDER — ACETAMINOPHEN 10 MG/ML
INJECTION, SOLUTION INTRAVENOUS AS NEEDED
Status: DISCONTINUED | OUTPATIENT
Start: 2018-03-29 | End: 2018-03-29 | Stop reason: HOSPADM

## 2018-03-29 RX ORDER — MIDAZOLAM HYDROCHLORIDE 1 MG/ML
0.5 INJECTION, SOLUTION INTRAMUSCULAR; INTRAVENOUS
Status: DISCONTINUED | OUTPATIENT
Start: 2018-03-29 | End: 2018-03-29 | Stop reason: HOSPADM

## 2018-03-29 RX ORDER — MIDAZOLAM HYDROCHLORIDE 1 MG/ML
1 INJECTION, SOLUTION INTRAMUSCULAR; INTRAVENOUS AS NEEDED
Status: DISCONTINUED | OUTPATIENT
Start: 2018-03-29 | End: 2018-03-29 | Stop reason: HOSPADM

## 2018-03-29 RX ORDER — TEMAZEPAM 30 MG/1
30 CAPSULE ORAL
Status: DISCONTINUED | OUTPATIENT
Start: 2018-03-29 | End: 2018-03-30 | Stop reason: HOSPADM

## 2018-03-29 RX ORDER — ROCURONIUM BROMIDE 10 MG/ML
INJECTION, SOLUTION INTRAVENOUS AS NEEDED
Status: DISCONTINUED | OUTPATIENT
Start: 2018-03-29 | End: 2018-03-29 | Stop reason: HOSPADM

## 2018-03-29 RX ORDER — DEXAMETHASONE SODIUM PHOSPHATE 4 MG/ML
INJECTION, SOLUTION INTRA-ARTICULAR; INTRALESIONAL; INTRAMUSCULAR; INTRAVENOUS; SOFT TISSUE AS NEEDED
Status: DISCONTINUED | OUTPATIENT
Start: 2018-03-29 | End: 2018-03-29 | Stop reason: HOSPADM

## 2018-03-29 RX ORDER — CEFAZOLIN SODIUM IN 0.9 % NACL 2 G/100 ML
PLASTIC BAG, INJECTION (ML) INTRAVENOUS AS NEEDED
Status: DISCONTINUED | OUTPATIENT
Start: 2018-03-29 | End: 2018-03-29 | Stop reason: HOSPADM

## 2018-03-29 RX ORDER — SODIUM CHLORIDE 9 MG/ML
25 INJECTION, SOLUTION INTRAVENOUS CONTINUOUS
Status: DISCONTINUED | OUTPATIENT
Start: 2018-03-29 | End: 2018-03-29 | Stop reason: HOSPADM

## 2018-03-29 RX ORDER — HYDROMORPHONE HYDROCHLORIDE 2 MG/ML
INJECTION, SOLUTION INTRAMUSCULAR; INTRAVENOUS; SUBCUTANEOUS AS NEEDED
Status: DISCONTINUED | OUTPATIENT
Start: 2018-03-29 | End: 2018-03-29 | Stop reason: HOSPADM

## 2018-03-29 RX ORDER — ONDANSETRON 2 MG/ML
4 INJECTION INTRAMUSCULAR; INTRAVENOUS AS NEEDED
Status: DISCONTINUED | OUTPATIENT
Start: 2018-03-29 | End: 2018-03-29 | Stop reason: HOSPADM

## 2018-03-29 RX ORDER — BUPIVACAINE HYDROCHLORIDE AND EPINEPHRINE 5; 5 MG/ML; UG/ML
INJECTION, SOLUTION EPIDURAL; INTRACAUDAL; PERINEURAL AS NEEDED
Status: DISCONTINUED | OUTPATIENT
Start: 2018-03-29 | End: 2018-03-29 | Stop reason: HOSPADM

## 2018-03-29 RX ORDER — QUETIAPINE FUMARATE 25 MG/1
75 TABLET, FILM COATED ORAL
Status: DISCONTINUED | OUTPATIENT
Start: 2018-03-29 | End: 2018-03-30 | Stop reason: HOSPADM

## 2018-03-29 RX ORDER — SODIUM CHLORIDE 0.9 % (FLUSH) 0.9 %
5-10 SYRINGE (ML) INJECTION AS NEEDED
Status: DISCONTINUED | OUTPATIENT
Start: 2018-03-29 | End: 2018-03-29 | Stop reason: HOSPADM

## 2018-03-29 RX ORDER — LAMOTRIGINE 25 MG/1
100 TABLET ORAL DAILY
Status: DISCONTINUED | OUTPATIENT
Start: 2018-03-30 | End: 2018-03-30 | Stop reason: HOSPADM

## 2018-03-29 RX ORDER — SODIUM CHLORIDE 0.9 % (FLUSH) 0.9 %
5-10 SYRINGE (ML) INJECTION EVERY 8 HOURS
Status: DISCONTINUED | OUTPATIENT
Start: 2018-03-29 | End: 2018-03-29 | Stop reason: HOSPADM

## 2018-03-29 RX ORDER — MIDAZOLAM HYDROCHLORIDE 1 MG/ML
INJECTION, SOLUTION INTRAMUSCULAR; INTRAVENOUS AS NEEDED
Status: DISCONTINUED | OUTPATIENT
Start: 2018-03-29 | End: 2018-03-29 | Stop reason: HOSPADM

## 2018-03-29 RX ORDER — SODIUM CHLORIDE 9 MG/ML
50 INJECTION, SOLUTION INTRAVENOUS CONTINUOUS
Status: DISCONTINUED | OUTPATIENT
Start: 2018-03-29 | End: 2018-03-30 | Stop reason: HOSPADM

## 2018-03-29 RX ORDER — PROPOFOL 10 MG/ML
INJECTION, EMULSION INTRAVENOUS AS NEEDED
Status: DISCONTINUED | OUTPATIENT
Start: 2018-03-29 | End: 2018-03-29 | Stop reason: HOSPADM

## 2018-03-29 RX ORDER — FENTANYL CITRATE 50 UG/ML
25 INJECTION, SOLUTION INTRAMUSCULAR; INTRAVENOUS
Status: DISCONTINUED | OUTPATIENT
Start: 2018-03-29 | End: 2018-03-29 | Stop reason: HOSPADM

## 2018-03-29 RX ORDER — OXYCODONE HYDROCHLORIDE 5 MG/1
5 TABLET ORAL AS NEEDED
Status: DISCONTINUED | OUTPATIENT
Start: 2018-03-29 | End: 2018-03-29 | Stop reason: HOSPADM

## 2018-03-29 RX ORDER — SODIUM CHLORIDE, SODIUM LACTATE, POTASSIUM CHLORIDE, CALCIUM CHLORIDE 600; 310; 30; 20 MG/100ML; MG/100ML; MG/100ML; MG/100ML
100 INJECTION, SOLUTION INTRAVENOUS CONTINUOUS
Status: DISCONTINUED | OUTPATIENT
Start: 2018-03-29 | End: 2018-03-29 | Stop reason: HOSPADM

## 2018-03-29 RX ORDER — GABAPENTIN 300 MG/1
300 CAPSULE ORAL DAILY
Status: DISCONTINUED | OUTPATIENT
Start: 2018-03-30 | End: 2018-03-30 | Stop reason: HOSPADM

## 2018-03-29 RX ORDER — SODIUM CHLORIDE, SODIUM LACTATE, POTASSIUM CHLORIDE, CALCIUM CHLORIDE 600; 310; 30; 20 MG/100ML; MG/100ML; MG/100ML; MG/100ML
1000 INJECTION, SOLUTION INTRAVENOUS CONTINUOUS
Status: DISCONTINUED | OUTPATIENT
Start: 2018-03-29 | End: 2018-03-29 | Stop reason: HOSPADM

## 2018-03-29 RX ORDER — PROMETHAZINE HYDROCHLORIDE 25 MG/1
25 TABLET ORAL
Qty: 25 TAB | Refills: 0 | Status: SHIPPED | OUTPATIENT
Start: 2018-03-29

## 2018-03-29 RX ORDER — MORPHINE SULFATE 10 MG/ML
2 INJECTION, SOLUTION INTRAMUSCULAR; INTRAVENOUS
Status: DISCONTINUED | OUTPATIENT
Start: 2018-03-29 | End: 2018-03-29 | Stop reason: HOSPADM

## 2018-03-29 RX ORDER — GLYCOPYRROLATE 0.2 MG/ML
INJECTION INTRAMUSCULAR; INTRAVENOUS AS NEEDED
Status: DISCONTINUED | OUTPATIENT
Start: 2018-03-29 | End: 2018-03-29 | Stop reason: HOSPADM

## 2018-03-29 RX ORDER — WARFARIN 4 MG/1
4 TABLET ORAL DAILY
Qty: 30 TAB | Refills: 2 | Status: SHIPPED | OUTPATIENT
Start: 2018-03-29 | End: 2019-03-06

## 2018-03-29 RX ORDER — FENTANYL CITRATE 50 UG/ML
50 INJECTION, SOLUTION INTRAMUSCULAR; INTRAVENOUS AS NEEDED
Status: DISCONTINUED | OUTPATIENT
Start: 2018-03-29 | End: 2018-03-29 | Stop reason: HOSPADM

## 2018-03-29 RX ORDER — NEOSTIGMINE METHYLSULFATE 1 MG/ML
INJECTION INTRAVENOUS AS NEEDED
Status: DISCONTINUED | OUTPATIENT
Start: 2018-03-29 | End: 2018-03-29 | Stop reason: HOSPADM

## 2018-03-29 RX ORDER — CEFAZOLIN SODIUM/WATER 2 G/20 ML
2 SYRINGE (ML) INTRAVENOUS
Status: DISCONTINUED | OUTPATIENT
Start: 2018-03-29 | End: 2018-03-29 | Stop reason: HOSPADM

## 2018-03-29 RX ORDER — ROPIVACAINE HYDROCHLORIDE 5 MG/ML
150 INJECTION, SOLUTION EPIDURAL; INFILTRATION; PERINEURAL AS NEEDED
Status: DISCONTINUED | OUTPATIENT
Start: 2018-03-29 | End: 2018-03-29 | Stop reason: HOSPADM

## 2018-03-29 RX ORDER — LIDOCAINE HYDROCHLORIDE 10 MG/ML
0.1 INJECTION, SOLUTION EPIDURAL; INFILTRATION; INTRACAUDAL; PERINEURAL AS NEEDED
Status: DISCONTINUED | OUTPATIENT
Start: 2018-03-29 | End: 2018-03-29 | Stop reason: HOSPADM

## 2018-03-29 RX ORDER — KETOROLAC TROMETHAMINE 30 MG/ML
INJECTION, SOLUTION INTRAMUSCULAR; INTRAVENOUS AS NEEDED
Status: DISCONTINUED | OUTPATIENT
Start: 2018-03-29 | End: 2018-03-29 | Stop reason: HOSPADM

## 2018-03-29 RX ORDER — OXYCODONE AND ACETAMINOPHEN 7.5; 325 MG/1; MG/1
1 TABLET ORAL
Qty: 40 TAB | Refills: 0 | Status: SHIPPED | OUTPATIENT
Start: 2018-03-29 | End: 2018-03-30

## 2018-03-29 RX ORDER — LIDOCAINE HYDROCHLORIDE 20 MG/ML
INJECTION, SOLUTION EPIDURAL; INFILTRATION; INTRACAUDAL; PERINEURAL AS NEEDED
Status: DISCONTINUED | OUTPATIENT
Start: 2018-03-29 | End: 2018-03-29 | Stop reason: HOSPADM

## 2018-03-29 RX ORDER — HYDROMORPHONE HCL/0.9% NACL/PF 0.5 MG/ML
PLASTIC BAG, INJECTION (ML) INTRAVENOUS
Status: DISCONTINUED | OUTPATIENT
Start: 2018-03-29 | End: 2018-03-30 | Stop reason: HOSPADM

## 2018-03-29 RX ORDER — CEFAZOLIN SODIUM/WATER 2 G/20 ML
2 SYRINGE (ML) INTRAVENOUS EVERY 8 HOURS
Status: COMPLETED | OUTPATIENT
Start: 2018-03-29 | End: 2018-03-30

## 2018-03-29 RX ORDER — ONDANSETRON 2 MG/ML
INJECTION INTRAMUSCULAR; INTRAVENOUS AS NEEDED
Status: DISCONTINUED | OUTPATIENT
Start: 2018-03-29 | End: 2018-03-29 | Stop reason: HOSPADM

## 2018-03-29 RX ORDER — DIPHENHYDRAMINE HYDROCHLORIDE 50 MG/ML
12.5 INJECTION, SOLUTION INTRAMUSCULAR; INTRAVENOUS AS NEEDED
Status: DISCONTINUED | OUTPATIENT
Start: 2018-03-29 | End: 2018-03-29 | Stop reason: HOSPADM

## 2018-03-29 RX ORDER — KETAMINE HYDROCHLORIDE 10 MG/ML
INJECTION, SOLUTION INTRAMUSCULAR; INTRAVENOUS AS NEEDED
Status: DISCONTINUED | OUTPATIENT
Start: 2018-03-29 | End: 2018-03-29 | Stop reason: HOSPADM

## 2018-03-29 RX ORDER — SODIUM CHLORIDE, SODIUM LACTATE, POTASSIUM CHLORIDE, CALCIUM CHLORIDE 600; 310; 30; 20 MG/100ML; MG/100ML; MG/100ML; MG/100ML
INJECTION, SOLUTION INTRAVENOUS
Status: DISCONTINUED | OUTPATIENT
Start: 2018-03-29 | End: 2018-03-29 | Stop reason: HOSPADM

## 2018-03-29 RX ADMIN — MIDAZOLAM HYDROCHLORIDE 5 MG: 1 INJECTION, SOLUTION INTRAMUSCULAR; INTRAVENOUS at 11:42

## 2018-03-29 RX ADMIN — NEOSTIGMINE METHYLSULFATE 4 MG: 1 INJECTION INTRAVENOUS at 15:48

## 2018-03-29 RX ADMIN — KETAMINE HYDROCHLORIDE 30 MG: 10 INJECTION, SOLUTION INTRAMUSCULAR; INTRAVENOUS at 12:23

## 2018-03-29 RX ADMIN — SODIUM CHLORIDE, SODIUM LACTATE, POTASSIUM CHLORIDE, CALCIUM CHLORIDE: 600; 310; 30; 20 INJECTION, SOLUTION INTRAVENOUS at 10:48

## 2018-03-29 RX ADMIN — Medication 2 G: at 12:22

## 2018-03-29 RX ADMIN — Medication: at 18:23

## 2018-03-29 RX ADMIN — QUETIAPINE 75 MG: 25 TABLET ORAL at 22:01

## 2018-03-29 RX ADMIN — ONDANSETRON 4 MG: 2 INJECTION INTRAMUSCULAR; INTRAVENOUS at 12:23

## 2018-03-29 RX ADMIN — ONDANSETRON 4 MG: 2 INJECTION INTRAMUSCULAR; INTRAVENOUS at 15:47

## 2018-03-29 RX ADMIN — SODIUM CHLORIDE, SODIUM LACTATE, POTASSIUM CHLORIDE, AND CALCIUM CHLORIDE 1000 ML: 600; 310; 30; 20 INJECTION, SOLUTION INTRAVENOUS at 11:42

## 2018-03-29 RX ADMIN — HYDROMORPHONE HYDROCHLORIDE 1 MG: 2 INJECTION, SOLUTION INTRAMUSCULAR; INTRAVENOUS; SUBCUTANEOUS at 12:08

## 2018-03-29 RX ADMIN — Medication 2 G: at 20:41

## 2018-03-29 RX ADMIN — PROPOFOL 50 MG: 10 INJECTION, EMULSION INTRAVENOUS at 16:31

## 2018-03-29 RX ADMIN — ROCURONIUM BROMIDE 50 MG: 10 INJECTION, SOLUTION INTRAVENOUS at 12:09

## 2018-03-29 RX ADMIN — SODIUM CHLORIDE, SODIUM LACTATE, POTASSIUM CHLORIDE, CALCIUM CHLORIDE: 600; 310; 30; 20 INJECTION, SOLUTION INTRAVENOUS at 13:28

## 2018-03-29 RX ADMIN — SODIUM CHLORIDE 50 ML/HR: 900 INJECTION, SOLUTION INTRAVENOUS at 17:45

## 2018-03-29 RX ADMIN — ACETAMINOPHEN 1000 MG: 10 INJECTION, SOLUTION INTRAVENOUS at 12:29

## 2018-03-29 RX ADMIN — GLYCOPYRROLATE 0.2 MG: 0.2 INJECTION INTRAMUSCULAR; INTRAVENOUS at 12:00

## 2018-03-29 RX ADMIN — MIDAZOLAM HYDROCHLORIDE 2 MG: 1 INJECTION, SOLUTION INTRAMUSCULAR; INTRAVENOUS at 12:00

## 2018-03-29 RX ADMIN — FENTANYL CITRATE 100 MCG: 50 INJECTION, SOLUTION INTRAMUSCULAR; INTRAVENOUS at 11:42

## 2018-03-29 RX ADMIN — MIDAZOLAM HYDROCHLORIDE 2 MG: 1 INJECTION, SOLUTION INTRAMUSCULAR; INTRAVENOUS at 12:06

## 2018-03-29 RX ADMIN — KETOROLAC TROMETHAMINE 30 MG: 30 INJECTION, SOLUTION INTRAMUSCULAR; INTRAVENOUS at 16:13

## 2018-03-29 RX ADMIN — TEMAZEPAM 30 MG: 30 CAPSULE ORAL at 22:45

## 2018-03-29 RX ADMIN — Medication: at 17:25

## 2018-03-29 RX ADMIN — DEXAMETHASONE SODIUM PHOSPHATE 10 MG: 4 INJECTION, SOLUTION INTRA-ARTICULAR; INTRALESIONAL; INTRAMUSCULAR; INTRAVENOUS; SOFT TISSUE at 12:23

## 2018-03-29 RX ADMIN — PROPOFOL 150 MG: 10 INJECTION, EMULSION INTRAVENOUS at 12:08

## 2018-03-29 RX ADMIN — GLYCOPYRROLATE 0.4 MG: 0.2 INJECTION INTRAMUSCULAR; INTRAVENOUS at 15:48

## 2018-03-29 RX ADMIN — KETAMINE HYDROCHLORIDE 20 MG: 10 INJECTION, SOLUTION INTRAMUSCULAR; INTRAVENOUS at 12:08

## 2018-03-29 RX ADMIN — LIDOCAINE HYDROCHLORIDE 60 MG: 20 INJECTION, SOLUTION EPIDURAL; INFILTRATION; INTRACAUDAL; PERINEURAL at 12:08

## 2018-03-29 RX ADMIN — HYDROMORPHONE HYDROCHLORIDE 1 MG: 2 INJECTION, SOLUTION INTRAMUSCULAR; INTRAVENOUS; SUBCUTANEOUS at 15:32

## 2018-03-29 RX ADMIN — ONDANSETRON 4 MG: 2 INJECTION INTRAMUSCULAR; INTRAVENOUS at 17:25

## 2018-03-29 NOTE — BRIEF OP NOTE
BRIEF OPERATIVE NOTE    Date of Procedure: 3/29/2018   Preoperative Diagnosis: SYNOVITIS/FIBROSIS AND INSTABILITY RIGHT ANKLE  Postoperative Diagnosis: SYNOVITIS/FIBROSIS AND INSTABILITY RIGHT ANKLE    Procedure(s):  ARTHROSCOPIC DEBRIDEMENT/ DOUBLE LIGAMENT STABILIZATION RIGHT ANKLE (LATEX ALLERGY)  Surgeon(s) and Role:     * Jo-Ann Moreno DPM - Primary     * Tyler Hartley MD - Resident - Assisting         Assistant Staff: None      Surgical Staff:  Circ-1: Lali Castillo  Circ-2: Tati Brunson RN  Circ-Relief: Med Castaneda RN  Scrub RN-1: Libertad Flores  Event Time In   Incision Start 1233   Incision Close      Anesthesia: General   Estimated Blood Loss: 25cc  Specimens: * No specimens in log *   Findings: fibrosis  An unstable ankle   Complications: none  Implants:   Implant Name Type Inv.  Item Serial No.  Lot No. LRB No. Used Action   ANCHOR BICMP SJT SUTAK 3MM 1FW --  - [id number]  ANCHOR BICMP SJT SUTAK 3MM 1FW --  [id number] [employer] [id number] Right 3 Implanted   ANCHOR BICMP SJT SUTAK 3MM 1FW --  - [id number]  ANCHOR BICMP SJT SUTAK 3MM 1FW --  [id number] [employer]X [id number] Right 1 Implanted

## 2018-03-29 NOTE — IP AVS SNAPSHOT
[address] [facility] [address] [place] [address] 
[phone] Patient: Gilma Sam MRN: [id number] :1965 About your hospitalization You were [facility] on:  [facility]  [facility] Why you were hospitalized Your primary diagnosis was:  Not on File Your diagnoses also included:  Fibrosis Of Ankle Joint, Right, Unstable Ankle, Right Follow-up Information Follow up With Details Comments [place]   [phone] [phone] Discharge Orders None A check marcella indicates which time of day the medication should be taken. My Medications START taking these medications Instructions Each Dose to Equal  
 Morning Noon Evening Bedtime  
 warfarin 4 mg tablet Commonly known as:  COUMADIN Your last dose was: Your next dose is: Take 1 Tab by mouth daily. Indications: DEEP VEIN THROMBOSIS PREVENTION  
 4 mg CHANGE how you take these medications Instructions Each Dose to Equal  
 Morning Noon Evening Bedtime * morphine IR 15 mg tablet Commonly known as:  MS IR What changed:  Another medication with the same name was added. Make sure you understand how and when to take each. Your last dose was: Your next dose is: Take 1 Tab by mouth every four (4) hours as needed for Pain. Max Daily Amount: 90 mg. Indications: Pain 15 mg  
    
   
   
   
  
 * morphine IR 15 mg tablet Commonly known as:  MS IR What changed: You were already taking a medication with the same name, and this prescription was added. Make sure you understand how and when to take each. Your last dose was: Your next dose is: Take 1 Tab by mouth every six (6) hours as needed for Pain.  Max Daily Amount: 60 mg. Indications: Pain 15 mg  
    
   
   
   
  
 * promethazine 25 mg tablet Commonly known as:  PHENERGAN What changed:  Another medication with the same name was added. Make sure you understand how and when to take each. Your last dose was: Your next dose is: Take 1 Tab by mouth every six (6) hours as needed for Nausea. Indications: POST-OPERATIVE NAUSEA AND VOMITING  
 25 mg  
    
   
   
   
  
 * promethazine 25 mg tablet Commonly known as:  PHENERGAN What changed: You were already taking a medication with the same name, and this prescription was added. Make sure you understand how and when to take each. Your last dose was: Your next dose is: Take 1 Tab by mouth every six (6) hours as needed for Nausea. Indications: Pain Treatment Adjunct, POST-OPERATIVE NAUSEA AND VOMITING  
 25 mg  
    
   
   
   
  
 * Notice: This list has 4 medication(s) that are the same as other medications prescribed for you. Read the directions carefully, and ask your doctor or other care provider to review them with you. ASK your doctor about these medications Instructions Each Dose to Equal  
 Morning Noon Evening Bedtime  
 gabapentin 300 mg capsule Commonly known as:  NEURONTIN Your last dose was: Your next dose is: Take 300 mg by mouth daily. 300 mg  
    
   
   
   
  
 lamoTRIgine 100 mg tablet Commonly known as: LaMICtal  
   
Your last dose was: Your next dose is: Take 100 mg by mouth daily. 100 mg  
    
   
   
   
  
 QUEtiapine 25 mg tablet Commonly known as:  SEROquel Your last dose was: Your next dose is: Take 75 mg by mouth nightly. TAKES 3 25MG TABS EVERY BEDTIME 75 mg  
    
   
   
   
  
 temazepam 30 mg capsule Commonly known as:  RESTORIL Your last dose was: Your next dose is:     
   
   
 Take 30 mg by mouth nightly. 30 mg  
    
   
   
   
  
 TRINTELLIX 10 mg tablet Generic drug:  vortioxetine Your last dose was: Your next dose is: Take 20 mg by mouth daily. 20 mg  
    
   
   
   
  
 VITAMIN D3 1,000 unit Cap Generic drug:  cholecalciferol Your last dose was: Your next dose is: Take  by mouth daily. Where to Get Your Medications Information on where to get these meds will be given to you by the nurse or doctor. ! Ask your nurse or doctor about these medications  
  morphine IR 15 mg tablet  
 promethazine 25 mg tablet  
 warfarin 4 mg tablet Opioid Education Prescription Opioids: What You Need to Know: 
 
 
ACTIVITY: 
· Elevate feet for 48 hours · Use ice as directed by your doctor · Use crutches / walker as directed by your doctor, and follow your doctors instructions as to your weight bearing status -NO WEIGHT ON THE RIGHT SIDE DIET: 
· Clear liquids until no nausea or vomiting; then light diet for the first day · An advance to regular diet on second day, unless your doctor orders otherwise. PAIN: 
· Take pain medication as directed by your doctor. · Call your doctor if pain is not relieved by medication. · Do not take aspirin or blood thinners until directed by your doctor. DRESSING CARE: keep dressing clean and dry, do not remove FOLLOW-UP PHONE CALLS: 
· Calls will be made by nursing staff. · If you had any problems, call your doctor as needed. CALL YOUR DOCTOR IF: 
· Excessive bleeding that does not stop after holding mild pressure over the area · Temperature of 101° F or above · Redness, excessive swelling or bruising, and/or green or yellow, smelly discharge from incision · Loss of sensation cold, white, or blue toes AFTER ANESTHESIA :  
· For the first 24 hours: do not drive, drink alcoholic beverages, or make important decisions. · Be aware of dizziness following anesthesia and while taking pain medication. DISCHARGE MEDICATIONS:  
   
 
APPOINTMENT DATE/TIME: please call for an appointment YOUR DOCTORS PHONE NUMBER: [phone] Patients signature: 
Date: 3/29/2018 Discharging Nurse:  
 
 
 
 
  
  
  
Introducing [facility]! Dear Jess Mess: Thank you for requesting a Parrut account. Our records indicate that you already have an active Parrut account. You can access your account anytime at [email or web address] Did you know that you can access your hospital and ER discharge instructions at any time in Parrut? You can also review all of your test results from your hospital stay or ER visit. Additional Information If you have questions, please visit the Frequently Asked Questions section of the Parrut website at [email or web address] Remember, Parrut is NOT to be used for urgent needs. For medical emergencies, dial 911. Now available from your iPhone and Android! Introducing Jhonatan Jansen As a [facility] patient, I wanted to make you aware of our electronic visit tool called Jhonatan Jansen. [facility] 24/7 allows you to connect within minutes with a medical provider 24 hours a day, seven days a week via a mobile device or tablet or logging into a secure website from your computer. You can access Jhonatan Jansen from anywhere in the [place]. A virtual visit might be right for you when you have a simple condition and feel like you just dont want to get out of bed, or cant get away from work for an appointment, when your regular [facility] provider is not available (evenings, weekends or holidays), or when youre out of town and need minor care.   Electronic visits cost only $49 and if the [employer] 24/7 provider determines a prescription is needed to treat your condition, one can be electronically transmitted to a nearby pharmacy*. Please take a moment to enroll today if you have not already done so. The enrollment process is free and takes just a few minutes. To enroll, please download the [employer] 24/7 bianca to your tablet or phone, or visit [email or web address] to enroll on your computer. And, as an [address] [place] [address] patient with a [employer] account, the results of your visits will be scanned into your electronic medical record and your primary care provider will be able to view the scanned results. We urge you to continue to see your regular [employer] provider for your ongoing medical care. And while your primary care provider may not be the one available when you seek a [employer] virtual visit, the peace of mind you get from getting a real diagnosis real time can be priceless. For more information on [employer], view our Frequently Asked Questions (FAQs) at [email or web address]. Sincerely, 
 
Gisel Elliott MD 
Chief Medical Officer [facility] Cinthya Toth *:  certain medications cannot be prescribed via [employer] Providers Seen During Your Hospitalization Provider Specialty Primary office phone Elliot Sibley [address] Podiatry [phone] Your Primary Care Physician (PCP) Primary Care Physician Office Phone Office Fax Maame Rogel [phone] You are allergic to the following Allergen Reactions Latex Itching Lyrica (Pregabalin) Other (comments) \" affects mood/aggressive\" Recent Documentation Height Weight Breastfeeding? BMI OB Status Smoking Status 1.626 m 90.7 kg No 34.32 kg/m2 Postmenopausal Former Smoker Emergency Contacts Name Discharge Info Relation Home Work Mobile  Sommers,Duane DISCHARGE CAREGIVER [3] Spouse [3] [phone] Patient Belongings The following personal items are in your possession at time of discharge: 
  Dental Appliances: Lowers, Uppers  Visual Aid: Glasses, With patient   Hearing Aids/Status: Does not own  Home Medications: None   Jewelry: None  Clothing: With patient    Other Valuables: None Please provide this summary of care documentation to your next provider. Signatures-by signing, you are acknowledging that this After Visit Summary has been reviewed with you and you have received a copy. Patient Signature:  ____________________________________________________________ Date:  ____________________________________________________________  
  
Kasandra Luan Provider Signature:  ____________________________________________________________ Date:  ____________________________________________________________

## 2018-03-29 NOTE — PROGRESS NOTES
1800    TRANSFER - IN REPORT:    Verbal report received from Liliam Lockhart RN(name) on [employer]  being received from [employer]) for routine post - op      Report consisted of patients Situation, Background, Assessment and   Recommendations(SBAR). Information from the following report(s) SBAR, Kardex, OR Summary, Intake/Output, MAR and Recent Results was reviewed with the receiving nurse. Opportunity for questions and clarification was provided. Assessment completed upon patients arrival to unit and care assumed.

## 2018-03-29 NOTE — IP AVS SNAPSHOT
[address] [facility] [address] [place] [address] 
[phone] Patient: Kelly Mari MRN: [id number] :1965 A check marcella indicates which time of day the medication should be taken. My Medications START taking these medications Instructions Each Dose to Equal  
 Morning Noon Evening Bedtime  
 warfarin 4 mg tablet Commonly known as:  COUMADIN Your last dose was: Your next dose is: Take 1 Tab by mouth daily. Indications: DEEP VEIN THROMBOSIS PREVENTION  
 4 mg CHANGE how you take these medications Instructions Each Dose to Equal  
 Morning Noon Evening Bedtime * morphine IR 15 mg tablet Commonly known as:  MS IR What changed:  Another medication with the same name was added. Make sure you understand how and when to take each. Your last dose was: Your next dose is: Take 1 Tab by mouth every four (4) hours as needed for Pain. Max Daily Amount: 90 mg. Indications: Pain 15 mg  
    
   
   
   
  
 * morphine IR 15 mg tablet Commonly known as:  MS IR What changed: You were already taking a medication with the same name, and this prescription was added. Make sure you understand how and when to take each. Your last dose was: Your next dose is: Take 1 Tab by mouth every six (6) hours as needed for Pain. Max Daily Amount: 60 mg. Indications: Pain 15 mg  
    
   
   
   
  
 * promethazine 25 mg tablet Commonly known as:  PHENERGAN What changed:  Another medication with the same name was added. Make sure you understand how and when to take each. Your last dose was: Your next dose is: Take 1 Tab by mouth every six (6) hours as needed for Nausea. Indications: POST-OPERATIVE NAUSEA AND VOMITING  
 25 mg  
    
   
   
   
  
 * promethazine 25 mg tablet Commonly known as:  PHENERGAN What changed: You were already taking a medication with the same name, and this prescription was added. Make sure you understand how and when to take each. Your last dose was: Your next dose is: Take 1 Tab by mouth every six (6) hours as needed for Nausea. Indications: Pain Treatment Adjunct, POST-OPERATIVE NAUSEA AND VOMITING  
 25 mg  
    
   
   
   
  
 * Notice: This list has 4 medication(s) that are the same as other medications prescribed for you. Read the directions carefully, and ask your doctor or other care provider to review them with you. ASK your doctor about these medications Instructions Each Dose to Equal  
 Morning Noon Evening Bedtime  
 gabapentin 300 mg capsule Commonly known as:  NEURONTIN Your last dose was: Your next dose is: Take 300 mg by mouth daily. 300 mg  
    
   
   
   
  
 lamoTRIgine 100 mg tablet Commonly known as: LaMICtal  
   
Your last dose was: Your next dose is: Take 100 mg by mouth daily. 100 mg  
    
   
   
   
  
 QUEtiapine 25 mg tablet Commonly known as:  SEROquel Your last dose was: Your next dose is: Take 75 mg by mouth nightly. TAKES 3 25MG TABS EVERY BEDTIME 75 mg  
    
   
   
   
  
 temazepam 30 mg capsule Commonly known as:  RESTORIL Your last dose was: Your next dose is: Take 30 mg by mouth nightly. 30 mg  
    
   
   
   
  
 TRINTELLIX 10 mg tablet Generic drug:  vortioxetine Your last dose was: Your next dose is: Take 20 mg by mouth daily. 20 mg  
    
   
   
   
  
 VITAMIN D3 1,000 unit Cap Generic drug:  cholecalciferol Your last dose was: Your next dose is: Take  by mouth daily. Where to Get Your Medications Information on where to get these meds will be given to you by the nurse or doctor. ! Ask your nurse or doctor about these medications  
  morphine IR 15 mg tablet  
 promethazine 25 mg tablet  
 warfarin 4 mg tablet

## 2018-03-29 NOTE — PERIOP NOTES
TRANSFER - OUT REPORT:    Verbal report given to BAYRON Sanchez(name) on Ky Burroughs  being transferred to 56(unit) for routine post - op       Report consisted of patients Situation, Background, Assessment and   Recommendations(SBAR). Time Pre op antibiotic given:1222  Anesthesia Stop time: 9225    Discharge Prescriptions with Chart x 3    Information from the following report(s) SBAR, OR Summary, Intake/Output, MAR and Cardiac Rhythm NSR. was reviewed with the receiving nurse. Opportunity for questions and clarification was provided. Is the patient on 02? YES       L/Min 2       Is the patient on a monitor? NO    Is the nurse transporting with the patient? NO    Surgical Waiting Area notified of patient's transfer from PACU? YES      The following personal items collected during your admission accompanied patient upon transfer:   Dental Appliance: Dental Appliances: Lowers, Uppers  Vision: Visual Aid: Glasses  Hearing Aid:    Jewelry: Jewelry: None  Clothing: Clothing: With patient  Other Valuables:  Other Valuables: None  Valuables sent to safe:

## 2018-03-29 NOTE — PERIOP NOTES
Sis Godfrey TO UPDATE THE  WITH START AND PROGRESS OF PROCEDURE,  WAS NOT IN THE WAITING AREA, I LEFT THE UPDATE WITH THE VOLUNTEER TO RELAY TO PATIENT'S

## 2018-03-29 NOTE — PROGRESS NOTES
7:06 PM  Spoke with [facility], Dr. Susie Head RN, (Dr. Vivian Santana not available by phone.) about Pt stating that she is having suicidal thoughts. Will put in order for triage hospitalists and suggest psych eval.  [facility] stated she and Dr. Vivian Santana were aware of pt's hx of anxiety, depression, and suicidal thoughts. 8:00 PM  Dr. Sharath Soria returned call for hospitalists, advised RN to put in order for [facility] and Psych. Orders put in by Jhon Madsen. Dr. Sharath Soria asked that Dr. Rand Marina be informed of situation. Will follow up with Dr. Rand Marina.    At 8:30 PM, pat stated she felt she was having a hard time breathing. She appeared to be hyperventilating. All VS were normal.  Visit Vitals    /69 (BP 1 Location: Right arm, BP Patient Position: At rest)    Pulse 93    Temp 97.3 °F (36.3 °C)    Resp 12    Ht 5' 4\" (1.626 m)    Wt 90.7 kg (199 lb 15.3 oz)    LMP 07/30/2015 (Exact Date)    SpO2 93%    Breastfeeding No    BMI 34.32 kg/m2     RN was able to calm patient. Pt reported feeling her throat was tight, but improved with drinking cold water. Pt was encouraged to use PCA pump after complaining of pain in ankle. Pt resumed normal respirations and behavior after appprox 10 mins. RN called pt's  per pt request, left voicemail stating pt was ok but anxious and requested  call nurses station and/or return to hospital per pt's request.  Pt requested bedpan after RN left voicemail. Pt is watching TV and is calm. Will continue to monitor pt. JANET Arcos, will sit with pt.    9:08 PM  RN informed that [facility] returned call related to order requesting their services. [facility] states they only serve ER patients not being admitted. Psych eval order put in, psych states that they do not have anyone who can see pt tonight but a doctor will see pt. In morning after psych pts are seen.     9:25 PM  Dr. Brad Castañeda came to see patient, he explained to pt that psych would see her in the am and that she would have a sitter for night. Do not need to speak with Dr. Silvino Rubin at this time.

## 2018-03-29 NOTE — ANESTHESIA PROCEDURE NOTES
Peripheral Block Start time: 3/29/2018 11:31 AM 
End time: 3/29/2018 11:39 AM 
Performed by: Subhash Salazar Authorized by: Subhash Salazar  
 
 
Pre-procedure: Indications: at surgeon's request and post-op pain management Preanesthetic Checklist: patient identified, risks and benefits discussed, site marked, timeout performed, anesthesia consent given and patient being monitored Timeout Time: 11:31 Block Type:  
Block Type:  Popliteal 
Laterality:  Right Monitoring:  Standard ASA monitoring, continuous pulse ox, frequent vital sign checks, heart rate, responsive to questions and oxygen Injection Technique:  Single shot Procedures: nerve stimulator Patient Position: supine Prep: chlorhexidine Location:  Lower thigh Needle Type:  Stimuplex Needle Gauge:  22 G Needle Localization:  Nerve stimulator Motor Response: minimal motor response >0.4 mA Medication Injected:  0.5% 
ropivacaine Volume (mL):  30 
 
Assessment: 
Number of attempts:  1 Injection Assessment:  Incremental injection every 5 mL, negative aspiration for CSF, negative aspiration for blood, no paresthesia and no intravascular symptoms Patient tolerance:  Patient tolerated the procedure well with no immediate complications

## 2018-03-29 NOTE — ANESTHESIA PREPROCEDURE EVALUATION
Anesthetic History PONV Review of Systems / Medical History Patient summary reviewed, nursing notes reviewed and pertinent labs reviewed Pulmonary Within defined limits Neuro/Psych  
 
seizures Psychiatric history Cardiovascular Within defined limits GI/Hepatic/Renal 
  
GERD Endo/Other Obesity, arthritis and anemia Other Findings Physical Exam 
 
Airway Mallampati: II 
TM Distance: > 6 cm Neck ROM: normal range of motion Mouth opening: Normal 
 
 Cardiovascular Regular rate and rhythm,  S1 and S2 normal,  no murmur, click, rub, or gallop Dental 
 
Dentition: Caps/crowns Pulmonary Breath sounds clear to auscultation Abdominal 
GI exam deferred Other Findings Anesthetic Plan ASA: 3 Anesthesia type: general 
 
 
Post-op pain plan if not by surgeon: peripheral nerve block single Induction: Intravenous Anesthetic plan and risks discussed with: Patient

## 2018-03-29 NOTE — PERIOP NOTES
1525-CALLED TO UPDATE PATIENT'S  ON THE PROGRESS OF THE PROCEDURE,  WAS NOT IN THE WAITING AREAS, I LEFT A UPDATE WITH THE VOLUNTEER. I WILL CALL AGAIN IN 20 MINS.

## 2018-03-30 VITALS
DIASTOLIC BLOOD PRESSURE: 63 MMHG | TEMPERATURE: 98.4 F | HEART RATE: 91 BPM | WEIGHT: 199.96 LBS | RESPIRATION RATE: 18 BRPM | HEIGHT: 64 IN | SYSTOLIC BLOOD PRESSURE: 94 MMHG | OXYGEN SATURATION: 93 % | BODY MASS INDEX: 34.14 KG/M2

## 2018-03-30 LAB
BASOPHILS # BLD: 0 K/UL (ref 0–0.1)
BASOPHILS NFR BLD: 0 % (ref 0–1)
DIFFERENTIAL METHOD BLD: ABNORMAL
EOSINOPHIL # BLD: 0 K/UL (ref 0–0.4)
EOSINOPHIL NFR BLD: 0 % (ref 0–7)
ERYTHROCYTE [DISTWIDTH] IN BLOOD BY AUTOMATED COUNT: 12.9 % (ref 11.5–14.5)
GLUCOSE BLD STRIP.AUTO-MCNC: 121 MG/DL (ref 65–100)
GLUCOSE BLD STRIP.AUTO-MCNC: 136 MG/DL (ref 65–100)
HCT VFR BLD AUTO: 35.4 % (ref 35–47)
HGB BLD-MCNC: 11.7 G/DL (ref 11.5–16)
IMM GRANULOCYTES # BLD: 0.1 K/UL (ref 0–0.04)
IMM GRANULOCYTES NFR BLD AUTO: 1 % (ref 0–0.5)
INR PPP: 1.1 (ref 0.9–1.1)
LYMPHOCYTES # BLD: 1.7 K/UL (ref 0.8–3.5)
LYMPHOCYTES NFR BLD: 14 % (ref 12–49)
MCH RBC QN AUTO: 31.3 PG (ref 26–34)
MCHC RBC AUTO-ENTMCNC: 33.1 G/DL (ref 30–36.5)
MCV RBC AUTO: 94.7 FL (ref 80–99)
MONOCYTES # BLD: 0.5 K/UL (ref 0–1)
MONOCYTES NFR BLD: 4 % (ref 5–13)
NEUTS SEG # BLD: 10 K/UL (ref 1.8–8)
NEUTS SEG NFR BLD: 81 % (ref 32–75)
NRBC # BLD: 0 K/UL (ref 0–0.01)
NRBC BLD-RTO: 0 PER 100 WBC
PLATELET # BLD AUTO: 239 K/UL (ref 150–400)
PMV BLD AUTO: 10.2 FL (ref 8.9–12.9)
PROTHROMBIN TIME: 10.7 SEC (ref 9–11.1)
RBC # BLD AUTO: 3.74 M/UL (ref 3.8–5.2)
SERVICE CMNT-IMP: ABNORMAL
SERVICE CMNT-IMP: ABNORMAL
WBC # BLD AUTO: 12.3 K/UL (ref 3.6–11)

## 2018-03-30 PROCEDURE — G8979 MOBILITY GOAL STATUS: HCPCS

## 2018-03-30 PROCEDURE — 82962 GLUCOSE BLOOD TEST: CPT

## 2018-03-30 PROCEDURE — 97161 PT EVAL LOW COMPLEX 20 MIN: CPT

## 2018-03-30 PROCEDURE — 74011250637 HC RX REV CODE- 250/637: Performed by: PODIATRIST

## 2018-03-30 PROCEDURE — 85610 PROTHROMBIN TIME: CPT | Performed by: PODIATRIST

## 2018-03-30 PROCEDURE — 36591 DRAW BLOOD OFF VENOUS DEVICE: CPT

## 2018-03-30 PROCEDURE — G8978 MOBILITY CURRENT STATUS: HCPCS

## 2018-03-30 PROCEDURE — 85025 COMPLETE CBC W/AUTO DIFF WBC: CPT | Performed by: PODIATRIST

## 2018-03-30 PROCEDURE — 36415 COLL VENOUS BLD VENIPUNCTURE: CPT | Performed by: PODIATRIST

## 2018-03-30 PROCEDURE — G8980 MOBILITY D/C STATUS: HCPCS

## 2018-03-30 PROCEDURE — 99218 HC RM OBSERVATION: CPT

## 2018-03-30 PROCEDURE — 74011250636 HC RX REV CODE- 250/636: Performed by: PODIATRIST

## 2018-03-30 PROCEDURE — 97530 THERAPEUTIC ACTIVITIES: CPT

## 2018-03-30 RX ORDER — MORPHINE SULFATE 15 MG/1
15 TABLET ORAL
Qty: 35 TAB | Refills: 0 | Status: SHIPPED | OUTPATIENT
Start: 2018-03-30

## 2018-03-30 RX ADMIN — GABAPENTIN 300 MG: 300 CAPSULE ORAL at 09:14

## 2018-03-30 RX ADMIN — LAMOTRIGINE 100 MG: 25 TABLET ORAL at 09:14

## 2018-03-30 RX ADMIN — Medication 2 G: at 05:53

## 2018-03-30 RX ADMIN — Medication: at 07:43

## 2018-03-30 NOTE — PROGRESS NOTES
Hospitalist    Consulted for suicidal ideation  Psych consulted  BP slight low due to PCA  C/w Oxygen to keep Sat>90  No further testing necessary  Signing off      Maria De Jesus Venegas MD  Internal Medicine  Mobile/text: [phone]

## 2018-03-30 NOTE — PROGRESS NOTES
8:51 PM  Bedside and Verbal shift change report given to Matthew Marcano RN (oncoming nurse) by Elham Coleman RN (offgoing nurse). Report included the following information SBAR, Kardex, OR Summary, Intake/Output and MAR.

## 2018-03-30 NOTE — PROGRESS NOTES
physical Therapy EVALUATION/DISCHARGE  Patient: Ky Burroughs (67 y.o. female)  Date: 3/30/2018  Primary Diagnosis: SYNOVITIS/FIBROSIS AND INSTABILITY RIGHT ANKLE  Unstable ankle, right  Fibrosis of ankle joint, right  Procedure(s) (LRB):  ARTHROSCOPIC DEBRIDEMENT/ DOUBLE LIGAMENT STABILIZATION RIGHT ANKLE (LATEX ALLERGY) (Right) 1 Day Post-Op   Precautions:   Fall, NWB (RLE)  ASSESSMENT :  Based on the objective data described below, the patient presents with decreased mobility after debridement and stabilization of the R ankle POD1. Patient has had multiple surgeries on the R ankle and has manual and power wheelchairs at home. Her plan is to D/C to her son's home from here since his bathrooms are wheelchair accessible. He also has a ramp to enter the home. Patient is able to maintain NWB on the RLE for transfers to and from a chair but not for ambulation due to bilateral carpal tunnel syndrome and general weakness and chronic pain. Her VS were stable and she was able to transfer without any issues. She has no further PT needs at this time and is safe for D/C from a mobility standpoint. PLAN :  Discharge Recommendations: None  Further Equipment Recommendations for Discharge: none     SUBJECTIVE:   Patient stated I don't know why I am on suicide watch, I just had a panic attack. 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, 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    Unspecified adverse effect of anesthesia TROUBLE WAKING IN THE PAST     Past Surgical History:   Procedure Laterality Date    HX CHOLECYSTECTOMY  1999    HX GI  2017    COLONOSCOPY    HX GYN  2006    TUBAL PREGNANCY AFTER TUBAL LIGATION    HX GYN  2010    UTERINE POLYPS REMOVED    HX GYN  2010    THINNING OF UTERUS    HX HEENT      MOUTH/DENTAL SURGERY- UPPER AND LOWER IMPLANTS    HX ORTHOPAEDIC      REMOVED CHIP    HX ORTHOPAEDIC      CLEANED OUT SUBTAYLOR JOINT    HX ORTHOPAEDIC  2014    FUSED COBALT JOINT    HX ORTHOPAEDIC Right 2015    PLANTAR FASCIITIS     HX TUBAL LIGATION  2006     Prior Level of Function/Home Situation: independent ambulation prior to admission, has DME as noted above  Personal factors and/or comorbidities impacting plan of care: R ankle surgery, carpal tunnel, chronic pain    Home Situation  Home Environment: Private residence  One/Two Story Residence: One story  Living Alone: No  Support Systems: Child(amara), Family member(s), Friends \ neighbors, Spouse/Significant Other/Partner  Patient Expects to be Discharged to[de-identified] Private residence  Current DME Used/Available at Home: Cane, straight, Wheelchair, power    EXAMINATION/PRESENTATION/DECISION MAKING:   Critical Behavior:  Neurologic State: Alert, Appropriate for age           Hearing: Auditory  Auditory Impairment: Hard of hearing, left side  Hearing Aids/Status: Does not own  Skin:  Post op dressing in place  Edema: none noted  Range Of Motion:  AROM: Within functional limits                       Strength:    Strength: Within functional limits                    Tone & Sensation:   Tone: Normal              Sensation: Intact               Coordination:  Coordination: Within functional limits  Vision:      Functional Mobility:  Bed Mobility:  Rolling: Modified independent  Supine to Sit: Modified independent  Sit to Supine: Modified independent  Scooting: Modified independent  Transfers:  Sit to Stand: Supervision; Additional time; Adaptive equipment  Stand to Sit: Supervision; Additional time; Adaptive equipment  Stand Pivot Transfers: Supervision                    Balance:   Sitting: Intact; Without support  Standing: Intact; With support  Ambulation/Gait Training:                                                             Stairs:               Functional Measure:  Barthel Index:    Bathin (inferred)  Bladder: 10  Bowels: 10  Groomin  Dressing: 10  Feeding: 10  Mobility: 5 (inferrred, power chair)  Stairs: 0  Toilet Use: 10  Transfer (Bed to Chair and Back): 15  Total: 80       Barthel and G-code impairment scale:  Percentage of impairment CH  0% CI  1-19% CJ  20-39% CK  40-59% CL  60-79% CM  80-99% CN  100%   Barthel Score 0-100 100 99-80 79-60 59-40 20-39 1-19   0   Barthel Score 0-20 20 17-19 13-16 9-12 5-8 1-4 0      The Barthel ADL Index: Guidelines  1. The index should be used as a record of what a patient does, not as a record of what a patient could do. 2. The main aim is to establish degree of independence from any help, physical or verbal, however minor and for whatever reason. 3. The need for supervision renders the patient not independent. 4. A patient's performance should be established using the best available evidence. Asking the patient, friends/relatives and nurses are the usual sources, but direct observation and common sense are also important. However direct testing is not needed. 5. Usually the patient's performance over the preceding 24-48 hours is important, but occasionally longer periods will be relevant. 6. Middle categories imply that the patient supplies over 50 per cent of the effort. 7. Use of aids to be independent is allowed. Dave Resendiz., Barthel, D.W. (1925). Functional evaluation: the Barthel Index. [address] [facility] (14)2. MARANDA Newton, Zulema Huber., Saloni Aguirre., Justin, [address] ().  Measuring the change indisability after inpatient rehabilitation; comparison of the responsiveness of the Barthel Index and Functional [place] Measure. Journal of Neurology, Neurosurgery, and Psychiatry, 66(4), 131-714. ANTELMO Renteria.MAGDALENA, RIN Reynoso, & Rip Iglesias M.A. (2004.) Assessment of post-stroke quality of life in cost-effectiveness studies: The usefulness of the Barthel Index and the EuroQoL-5D. Quality of Life Research, 13, 981-83       G codes: In compliance with CMSs Claims Based Outcome Reporting, the following G-code set was chosen for this patient based on their primary functional limitation being treated: The outcome measure chosen to determine the severity of the functional limitation was the [facility] with a score of 80/100 which was correlated with the impairment scale. ? Mobility - Walking and Moving Around:     - CURRENT STATUS: CI - 1%-19% impaired, limited or restricted    - GOAL STATUS: CI - 1%-19% impaired, limited or restricted    - D/C STATUS:  CI - 1%-19% impaired, limited or restricted        Physical Therapy Evaluation Charge Determination   History Examination Presentation Decision-Making   MEDIUM  Complexity : 1-2 comorbidities / personal factors will impact the outcome/ POC  MEDIUM Complexity : 3 Standardized tests and measures addressing body structure, function, activity limitation and / or participation in recreation  LOW Complexity : Stable, uncomplicated  LOW Complexity : FOTO score of       Based on the above components, the patient evaluation is determined to be of the following complexity level: LOW     Pain:  Pain Scale 1: Numeric (0 - 10)  Pain Intensity 1: 10  Pain Location 1: Ankle  Activity Tolerance:   Good  Please refer to the flowsheet for vital signs taken during this treatment.   After treatment:   []   Patient left in no apparent distress sitting up in chair  [x]   Patient left in no apparent distress in bed  [x]   Call bell left within reach  [x]   Nursing notified  [x]   Caregiver present  []   Bed alarm activated    COMMUNICATION/EDUCATION:   Communication/Collaboration:  [x]   Fall prevention education was provided and the patient/caregiver indicated understanding. [x]   Patient/family have participated as able and agree with findings and recommendations. []   Patient is unable to participate in plan of care at this time.   Findings and recommendations were discussed with: Registered Nurse    Thank you for this referral.  Denton Kc, PT   Time Calculation: 25 mins

## 2018-03-30 NOTE — PROGRESS NOTES
Progress Note    Patient: Sarah Graves MRN: [id number]  SSN: [id number]    YOB: 1965  Age: 47 y.o. Sex: female      Admit Date: 3/29/2018    1 Day Post-Op    Procedure:  Procedure(s):  ARTHROSCOPIC DEBRIDEMENT/ DOUBLE LIGAMENT STABILIZATION RIGHT ANKLE (LATEX ALLERGY)    Subjective:     Patient has complaints of anxiety   was seen by Psychiatry. Objective:     Visit Vitals    BP 94/63 (BP 1 Location: Right arm, BP Patient Position: At rest)    Pulse 91    Temp 98.4 °F (36.9 °C)    Resp 18    Ht 5' 4\" (1.626 m)    Wt 90.7 kg (199 lb 15.3 oz)    SpO2 93%    Breastfeeding No    BMI 34.32 kg/m2       Temp (24hrs), Av.6 °F (36.4 °C), Min:97 °F (36.1 °C), Max:98.4 °F (36.9 °C)      Physical Exam:    Toes are warm ans pink  Has good active ROM   Dressing is clean and dry    Data Review: images and reports reviewed    Lab Review: All lab results for the last 24 hours reviewed.     Assessment:     Hospital Problems  Date Reviewed: 2017          Codes Class Noted POA    Fibrosis of ankle joint, right ICD-10-CM: M24.671  ICD-9-CM: 718.57  3/29/2018 Unknown        Unstable ankle, right ICD-10-CM: M25.371  ICD-9-CM: 718.87  3/29/2018 Unknown              Plan/Recommendations/Medical Decision Making:     Review PO instructions with pt  Review PO narcotics  D/c pt    Signed By: Alfredito Tan DPM     2018

## 2018-03-30 NOTE — PROGRESS NOTES
1215: Dr. Agustina Hernandez with psychiatry saw patient and cleared from psych standpoint this am; 1:1 and suicide precautions discontinued; per Dr. Agustina Hernandez, she does not recommend any additional psych/anxiety medications due to their sedating effect. Patient is exhibiting signs of anxiety including being tearful and stating she is just \"feeling anxious. \"; patient denies any suicidal thoughts. Patient encouraged to utilize deep breathing and meditation techniques. Care channel turned on, cool compresses applied to head and neck, lights dimmed and stimuli decreased. Will continue to monitor patient.

## 2018-03-30 NOTE — PROGRESS NOTES
Bedside shift change report given to BAYRON Carolina (oncoming nurse) by Jhonatan Colvin RN (offgoing nurse). Report included the following information SBAR.

## 2018-03-30 NOTE — PROGRESS NOTES
Problem: Falls - Risk of  Goal: *Absence of Falls  Document Luciana Fall Risk and appropriate interventions in the flowsheet. Outcome: Progressing Towards Goal  Fall Risk Interventions:  Mobility Interventions: Patient to call before getting OOB, Utilize walker, cane, or other assitive device         Medication Interventions: Patient to call before getting OOB, Teach patient to arise slowly    Elimination Interventions: Call light in reach, Elevated toilet seat, Patient to call for help with toileting needs, Toileting schedule/hourly rounds    History of Falls Interventions:  Investigate reason for fall, Room close to nurse's station, Door open when patient unattended

## 2018-03-30 NOTE — CONSULTS
[address]    Janneth Amaya  MR#: [id number]  : 1965  ACCOUNT #: [de-identified]   DATE OF SERVICE: 2018    PRIMARY CARE PHYSICIAN:  Winston Claude NP    DOCTOR REQUESTING MEDICAL CONSULT:  LEILA NÚÑEZ DPM    REASON FOR MEDICAL CONSULT:  Evaluation and treatment of suicidal ideation. SOURCE OF INFORMATION:  Patient. CHIEF COMPLAINT:  Suicidal ideation. HISTORY OF PRESENT ILLNESS:  This is a 62-year-old woman with past medical history significant for chronic pain syndrome, seizure disorder, anxiety/depression, was brought to the hospital today and underwent right ankle surgery for stabilization. The procedure was uneventful. Following the procedure, the patient was placed on observation by the podiatrist.  The patient has history of anxiety/depression. When the patient arrived at the floor, the patient was asked some questions as part of the nursing admission protocol. That is when it was reported that the patient admitted to suicidal ideation. She denies any means of carrying out the suicide. Patient stated that she feels like she is a burden to her family. The patient's podiatrist was informed about the patient's comment. The podiatrist requested medical consult for evaluation of suicidal ideation. The patient has a history of anxiety/depression but has not had suicide attempt in the past.  The patient is also complaining of pain at the site of surgery. She has had multiple left ankle surgeries done in the past.  The patient has no fever, no rigor, no chills. PAST MEDICAL HISTORY:  Chronic pain syndrome, anxiety/depression, seizure disorder. ALLERGIES:  PATIENT IS ALLERGIC TO LYRICA and LATEX.     MEDICATIONS PRIOR TO ADMISSION:  Restoril 30 mg daily at bedtime, Neurontin 300 mg daily, morphine IR 15 mg every 4 hours as needed for pain, Phenergan 25 mg every 6 hours as needed for nausea and vomiting, Lamictal 100 mg daily, Trintellix 20 mg daily, Seroquel 75 mg daily at bedtime. FAMILY HISTORY:  This was reviewed. Her father had hypertension, bladder cancer, and COPD. PAST SURGICAL HISTORY:  Significant for cholecystectomy, tubal ligation, multiple right foot surgeries. SOCIAL HISTORY:  The patient is a former smoker. No history of alcohol abuse. REVIEW OF SYSTEMS:  HEAD, EYES, EARS, NOSE, AND THROAT:  No headache, no dizziness, no blurring of vision, no photophobia. RESPIRATORY:  No cough, no shortness of breath. No hemoptysis. CARDIOVASCULAR:  No chest pain, no orthopnea, no palpitations. GASTROINTESTINAL:  No nausea, vomiting, no diarrhea, no constipation. GENITOURINARY:  No dysuria, no urgency, and no frequency. All other systems are reviewed and are negative. PHYSICAL EXAMINATION:  GENERAL APPEARANCE:  The patient appeared ill, in moderate distress. VITAL SIGNS:  Temperature 97.5, pulse 91, blood pressure 95/61, respiratory rate 14, oxygen saturation 92% on 3 liters oxygen. HEAD:  Normocephalic, atraumatic. EYES:  Normal eye movements. No redness, no drainage, no discharge. EARS:  Normal external ears with no obvious drainage. NOSE:  No deformity, no drainage. MOUTH AND THROAT:  No visible oral lesions. NECK:  Supple, no JVD, no thyromegaly. CHEST:  Clear breath sounds. No wheezing, no crackles. HEART:  Normal S1 and S2, regular. No clinically appreciable murmurs. ABDOMEN:  Soft, obese, nontender, normal bowel sounds. CENTRAL NERVOUS SYSTEM:  Alert and oriented x3. No gross focal neurological deficit. EXTREMITIES:  Intact dressing, right ankle, pulses 2+ left leg. MUSCULOSKELETAL:  Intact dressing, right leg noted. SKIN:  No acute skin lesion seen in the exposed part of the body. PSYCHIATRY:  Unable to assess mood and affect. LYMPHATIC:  No cervical lymphadenopathy. DIAGNOSTIC DATA:  None. LABORATORY DATA:  None. ASSESSMENT:  1.  Status post right ankle surgery.   2. Suicidal ideation. 3.  Anxiety/depression. 4.  Seizure disorder  5. Chronic pain syndrome. PLAN:  1. Status post right ankle surgery. The patient will continue postoperative care including PT, OT evaluation and treatment as well as DVT prophylaxis as per Podiatry. 2.  Suicidal ideation. Will request for suicide precaution. A psychiatry consult will be requested to assist in evaluation and treatment of suicidal ideation. 3.  Anxiety/depression. Will resume preadmission medications. 4.  Seizure disorder. Continue with preadmission medication. The patient has not had   any seizure in so many years. 5.  Chronic pain syndrome. Patient is on pain medication as per Podiatry. In summary, this is a 59-year-old woman who underwent right ankle surgery for stabilization of the right ankle. Medical consult was requested for evaluation and treatment of suicidal ideation and management of the patient's medical problems are as stated above. Thank you for involving the hospitalist service in the care of this patient. Will continue to follow the patient alongside with the podiatry service. Less than 1 hour spent on this medical consult.       MD ALLYSON Banda / BREA  D: 03/29/2018 22:20     T: 03/30/2018 10:16  JOB #: [id number]  CC: Junior Vizcaino DPM  CC: Winston Claude NP

## 2018-03-30 NOTE — CONSULTS
Psychiatry Consultation    The patient Britni Ha seen, chart reviewed and full psychiatric consultation report to follow. Impression:  MDD, chronic  TAJ, chronic  Opiate dependent ( pain med contract)    Plan:  NO evidence of suicidality or risk of danger to herself, may dc 1:1  Do not recommend any additional psychiatric medications at this time. Patient should follow up with her outpatient psychiatrist, Dr. Conteh Ice     Will follow patient's course along with you as necessary. Thank you for the opportunity to participate in the care of your patient.     Vicki Cardoso MD  3/30/2018

## 2018-03-30 NOTE — ANESTHESIA POSTPROCEDURE EVALUATION
Post-Anesthesia Evaluation and Assessment Patient: Mando Cassidy MRN: [id number]  SSN: [id number] YOB: 1965  Age: 47 y.o. Sex: female Cardiovascular Function/Vital Signs Visit Vitals  BP 97/60 (BP 1 Location: Right arm, BP Patient Position: At rest)  Pulse 83  Temp 36.9 °C (98.4 °F)  Resp 17  Ht 5' 4\" (1.626 m)  Wt 90.7 kg (199 lb 15.3 oz)  SpO2 94%  Breastfeeding No  
 BMI 34.32 kg/m2 Patient is status post general anesthesia for Procedure(s): ARTHROSCOPIC DEBRIDEMENT/ DOUBLE LIGAMENT STABILIZATION RIGHT ANKLE (LATEX ALLERGY). Nausea/Vomiting: None Postoperative hydration reviewed and adequate. Pain: 
Pain Scale 1: Numeric (0 - 10) (03/30/18 0731) Pain Intensity 1: 10 (03/30/18 0731) Managed Neurological Status:  
Neuro (WDL): Within Defined Limits (03/29/18 1730) Neuro Neurologic State: Alert; Appropriate for age (03/30/18 0003) RLE Motor Response: Numbness;Weak;Purposeful (03/29/18 1820) At baseline Mental Status and Level of Consciousness: Arousable Pulmonary Status:  
O2 Device: Nasal cannula (03/30/18 2102) Adequate oxygenation and airway patent Complications related to anesthesia: None Post-anesthesia assessment completed. No concerns Signed By: Josi Rincon MD   
 March 30, 2018

## 2018-03-30 NOTE — OP NOTES
[address] [facility] CARE OP NOTE    Ayanna Tolliver  MR#: [id number]  : 1965  ACCOUNT #: [de-identified]   DATE OF SERVICE: 2018    PREOPERATIVE DIAGNOSIS:  Right ankle fibrosis and synovitis and unstable ankle. POSTOPERATIVE DIAGNOSIS:  Right ankle fibrosis and synovitis and unstable ankle. PROCEDURES PERFORMED:  Arthroscopic debridement of the right ankle with double ligament stabilization. SURGEON:  Michael Traore DPM    ASSISTANT:  Janie Dumas. ANESTHESIA:  General.    ESTIMATED BLOOD LOSS:  30 mL. COMPLICATIONS:  None. PATHOLOGY:  None. IMPLANTS: arthrex 3.0 bone anchors    INDICATIONS:  The patient presents with chronic ankle pain. She has an unstable ankle and has had issues with standing on uneven surfaces. We discussed the surgery, the risks, the complications, alternatives to the procedure. She is aware and agreeable. DETAILS OF PROCEDURE:  She was taken to the operating room and placed on the operating table, supine position. After adequate induction of general anesthesia, the patient was prepped and draped in usual sterile manner. Attention was directed towards the right ankle. Two small portals were made along the medial and lateral aspects of the ankle. Each portal was developed with a hemostat down to the capsule and then entered with an obturator. Under direct observation, we saw the ankle with significant amounts of synovitis and fibrosis noted. We debrided the fibrosis and synovitis with use of thermal ablators and sucker guerline. Once we had the fibrosis and synovitis out, we could see the joint line of both the ankle and the subtalar joint. There was some hypertrophy of the anterior inferior tib-fib ligament and that was creating some degeneration on the lateral shoulder of the talus, a Freehold's lesion was noted. The area was debrided.   The Freehold's lesion was debrided as well as the ligament back to healthy normal ligament structure. Once we accomplished that, I made an incision along the lateral aspect of the fibula and we harvested a periosteal graft. Another incision was made from the anterolateral portal down over the subtalar joint and carried down to the level of superficial fascia and was peeled back. The capsule was entered on 1/3 and 2/3, biased. We then placed anchors in the calcaneus, 2 in the fibula and 1 in the talar neck. We then took the harvesting of the periosteal flap and sutured it into the calcaneus from the fibula recreating the calcaneofibular ligament. We then brought it back up into the anterior aspect of the fibula, across, into the anterior talofibular ligament insertion of the neck of the talus and then back across into the second fibular anchor. Once this was performed, we then brought the 2/3 capsule biased into the fibula with a Brostrom type fashion and then sutured the periosteal and capsular part of the incision over the insertion in a pants-over-vest fashion. That was sutured with 3-0 Vicryl. Superficial fascia was sutured with a 4-0 Vicryl. Skin was reapproximated with 5-0 and 4-0 Monocryl. The patient was placed into a dry sterile dressing with Xeroform over the incision lines followed by a posterior splint. She left the operating, taken to the recovery room.       SARA Doran  D: 03/29/2018 16:12     T: 03/30/2018 08:58  JOB #: [id number]

## 2018-03-30 NOTE — PROGRESS NOTES
Pt has been able to vopid but does not feel she is fully emptying her bladder - post void residuals are 215    Bedside shift change report given to Carlos Eduardo Martínez (oncoming nurse) by Victoria Mcdermott (offgoing nurse). Report included the following information SBAR, Kardex, Intake/Output and Recent Results.

## 2018-03-30 NOTE — PROGRESS NOTES
Placed call to Dr. Lety Figueroa and his nurse Kellie Hendrix to inquire about discharge today. Patient is still on PCA for pain control but states she should be discharged today. Messages left for both Dr. Lety Figueroa and Kellie Hendrix; awaiting return call.

## 2018-03-30 NOTE — PROGRESS NOTES
I have reviewed discharge instructions with the patient. The patient verbalized understanding. MEFS reviewed and teaching provided for all prescriptions being taken home; time allowed for questions/clarfication. Patient being sent home with all belongings, wheeled down to patient discharge by a PCT and transported home by her .

## 2018-03-30 NOTE — DISCHARGE INSTRUCTIONS
INSTRUCTIONS FOLLOWING FOOT SURGERY    ACTIVITY:  · Elevate feet for 48 hours  · Use ice as directed by your doctor  · Use crutches / walker as directed by your doctor, and follow your doctors instructions as to your weight bearing status -NO WEIGHT ON THE RIGHT SIDE    DIET:  · Clear liquids until no nausea or vomiting; then light diet for the first day  · An advance to regular diet on second day, unless your doctor orders otherwise. PAIN:  · Take pain medication as directed by your doctor. · Call your doctor if pain is not relieved by medication. · Do not take aspirin or blood thinners until directed by your doctor. DRESSING CARE: keep dressing clean and dry, do not remove       FOLLOW-UP PHONE CALLS:  · Calls will be made by nursing staff. · If you had any problems, call your doctor as needed. CALL YOUR DOCTOR IF:  · Excessive bleeding that does not stop after holding mild pressure over the area  · Temperature of 101° F or above  · Redness, excessive swelling or bruising, and/or green or yellow, smelly discharge from incision  · Loss of sensation -cold, white, or blue toes    AFTER ANESTHESIA :   · For the first 24 hours: do not drive, drink alcoholic beverages, or make important decisions. · Be aware of dizziness following anesthesia and while taking pain medication.       DISCHARGE MEDICATIONS:         APPOINTMENT DATE/TIME: please call for an appointment    YOUR DOCTORS PHONE NUMBER: [phone]    Patients signature:  Date: 3/29/2018  Discharging Nurse:

## 2019-03-07 ENCOUNTER — ANESTHESIA EVENT (OUTPATIENT)
Dept: MEDSURG UNIT | Age: 54
End: 2019-03-07
Payer: COMMERCIAL

## 2019-03-07 ENCOUNTER — ANESTHESIA (OUTPATIENT)
Dept: MEDSURG UNIT | Age: 54
End: 2019-03-07
Payer: COMMERCIAL

## 2019-03-07 ENCOUNTER — HOSPITAL ENCOUNTER (OUTPATIENT)
Age: 54
Setting detail: OBSERVATION
Discharge: HOME | End: 2019-03-08
Attending: PODIATRIST | Admitting: PODIATRIST
Payer: COMMERCIAL

## 2019-03-07 PROBLEM — Z96.7 FIXATION HARDWARE IN FOOT: Status: ACTIVE | Noted: 2019-03-07

## 2019-03-07 PROCEDURE — 77030031139 HC SUT VCRL2 J&J -A: Performed by: PODIATRIST

## 2019-03-07 PROCEDURE — 76030000004 HC AMB SURG OR TIME 2 TO 2.5: Performed by: PODIATRIST

## 2019-03-07 PROCEDURE — 77030011640 HC PAD GRND REM COVD -A: Performed by: PODIATRIST

## 2019-03-07 PROCEDURE — 76210000041 HC AMBSU PH I REC 4.5 TO 5 HR: Performed by: PODIATRIST

## 2019-03-07 PROCEDURE — 76060000064 HC AMB SURG ANES 2 TO 2.5 HR: Performed by: PODIATRIST

## 2019-03-07 PROCEDURE — 77030018836 HC SOL IRR NACL ICUM -A: Performed by: PODIATRIST

## 2019-03-07 PROCEDURE — 77030010396 HC WRE FIX C CNMD -A: Performed by: PODIATRIST

## 2019-03-07 PROCEDURE — 99218 HC RM OBSERVATION: CPT

## 2019-03-07 PROCEDURE — 74011000250 HC RX REV CODE- 250: Performed by: PODIATRIST

## 2019-03-07 PROCEDURE — 77030020782 HC GWN BAIR PAWS FLX 3M -B

## 2019-03-07 PROCEDURE — 77030020754 HC CUF TRNQT 2BLA STRY -B: Performed by: PODIATRIST

## 2019-03-07 PROCEDURE — 74011250637 HC RX REV CODE- 250/637: Performed by: PODIATRIST

## 2019-03-07 PROCEDURE — 74011250636 HC RX REV CODE- 250/636: Performed by: PODIATRIST

## 2019-03-07 PROCEDURE — 77030003601 HC NDL NRV BLK BBMI -A

## 2019-03-07 PROCEDURE — 77030002916 HC SUT ETHLN J&J -A: Performed by: PODIATRIST

## 2019-03-07 PROCEDURE — 74011250636 HC RX REV CODE- 250/636: Performed by: ANESTHESIOLOGY

## 2019-03-07 PROCEDURE — 74011250636 HC RX REV CODE- 250/636

## 2019-03-07 RX ORDER — BUPIVACAINE HYDROCHLORIDE AND EPINEPHRINE 5; 5 MG/ML; UG/ML
INJECTION, SOLUTION EPIDURAL; INTRACAUDAL; PERINEURAL AS NEEDED
Status: DISCONTINUED | OUTPATIENT
Start: 2019-03-07 | End: 2019-03-07 | Stop reason: HOSPADM

## 2019-03-07 RX ORDER — CEFAZOLIN SODIUM/WATER 2 G/20 ML
2 SYRINGE (ML) INTRAVENOUS EVERY 8 HOURS
Status: COMPLETED | OUTPATIENT
Start: 2019-03-07 | End: 2019-03-08

## 2019-03-07 RX ORDER — LIDOCAINE HYDROCHLORIDE 20 MG/ML
INJECTION, SOLUTION EPIDURAL; INFILTRATION; INTRACAUDAL; PERINEURAL AS NEEDED
Status: DISCONTINUED | OUTPATIENT
Start: 2019-03-07 | End: 2019-03-07 | Stop reason: HOSPADM

## 2019-03-07 RX ORDER — ROPIVACAINE HYDROCHLORIDE 5 MG/ML
300 INJECTION, SOLUTION EPIDURAL; INFILTRATION; PERINEURAL
Status: COMPLETED | OUTPATIENT
Start: 2019-03-07 | End: 2019-03-07

## 2019-03-07 RX ORDER — SODIUM CHLORIDE, SODIUM LACTATE, POTASSIUM CHLORIDE, CALCIUM CHLORIDE 600; 310; 30; 20 MG/100ML; MG/100ML; MG/100ML; MG/100ML
75 INJECTION, SOLUTION INTRAVENOUS CONTINUOUS
Status: DISCONTINUED | OUTPATIENT
Start: 2019-03-07 | End: 2019-03-07 | Stop reason: HOSPADM

## 2019-03-07 RX ORDER — KETOROLAC TROMETHAMINE 30 MG/ML
30 INJECTION, SOLUTION INTRAMUSCULAR; INTRAVENOUS
Status: DISCONTINUED | OUTPATIENT
Start: 2019-03-07 | End: 2019-03-07

## 2019-03-07 RX ORDER — PROMETHAZINE HYDROCHLORIDE 25 MG/1
25 TABLET ORAL
Status: DISCONTINUED | OUTPATIENT
Start: 2019-03-07 | End: 2019-03-08 | Stop reason: HOSPADM

## 2019-03-07 RX ORDER — MIDAZOLAM HYDROCHLORIDE 1 MG/ML
INJECTION, SOLUTION INTRAMUSCULAR; INTRAVENOUS AS NEEDED
Status: DISCONTINUED | OUTPATIENT
Start: 2019-03-07 | End: 2019-03-07 | Stop reason: HOSPADM

## 2019-03-07 RX ORDER — PROPOFOL 10 MG/ML
INJECTION, EMULSION INTRAVENOUS
Status: DISCONTINUED | OUTPATIENT
Start: 2019-03-07 | End: 2019-03-07 | Stop reason: HOSPADM

## 2019-03-07 RX ORDER — TRIAMCINOLONE ACETONIDE 40 MG/ML
INJECTION, SUSPENSION INTRA-ARTICULAR; INTRAMUSCULAR AS NEEDED
Status: DISCONTINUED | OUTPATIENT
Start: 2019-03-07 | End: 2019-03-07 | Stop reason: HOSPADM

## 2019-03-07 RX ORDER — PROPOFOL 10 MG/ML
INJECTION, EMULSION INTRAVENOUS AS NEEDED
Status: DISCONTINUED | OUTPATIENT
Start: 2019-03-07 | End: 2019-03-07 | Stop reason: HOSPADM

## 2019-03-07 RX ORDER — SODIUM CHLORIDE 0.9 % (FLUSH) 0.9 %
5-40 SYRINGE (ML) INJECTION AS NEEDED
Status: DISCONTINUED | OUTPATIENT
Start: 2019-03-07 | End: 2019-03-07 | Stop reason: HOSPADM

## 2019-03-07 RX ORDER — SODIUM CHLORIDE 9 MG/ML
50 INJECTION, SOLUTION INTRAVENOUS CONTINUOUS
Status: DISCONTINUED | OUTPATIENT
Start: 2019-03-07 | End: 2019-03-07 | Stop reason: HOSPADM

## 2019-03-07 RX ORDER — FENTANYL CITRATE 50 UG/ML
25 INJECTION, SOLUTION INTRAMUSCULAR; INTRAVENOUS
Status: COMPLETED | OUTPATIENT
Start: 2019-03-07 | End: 2019-03-07

## 2019-03-07 RX ORDER — SODIUM CHLORIDE 9 MG/ML
25 INJECTION, SOLUTION INTRAVENOUS CONTINUOUS
Status: DISCONTINUED | OUTPATIENT
Start: 2019-03-07 | End: 2019-03-07 | Stop reason: HOSPADM

## 2019-03-07 RX ORDER — HYDROCODONE BITARTRATE AND ACETAMINOPHEN 5; 325 MG/1; MG/1
1 TABLET ORAL AS NEEDED
Status: DISCONTINUED | OUTPATIENT
Start: 2019-03-07 | End: 2019-03-07 | Stop reason: HOSPADM

## 2019-03-07 RX ORDER — LIDOCAINE HYDROCHLORIDE 10 MG/ML
0.1 INJECTION, SOLUTION EPIDURAL; INFILTRATION; INTRACAUDAL; PERINEURAL AS NEEDED
Status: DISCONTINUED | OUTPATIENT
Start: 2019-03-07 | End: 2019-03-07 | Stop reason: HOSPADM

## 2019-03-07 RX ORDER — GABAPENTIN 300 MG/1
300 CAPSULE ORAL DAILY
Status: DISCONTINUED | OUTPATIENT
Start: 2019-03-08 | End: 2019-03-08 | Stop reason: HOSPADM

## 2019-03-07 RX ORDER — SODIUM CHLORIDE 0.9 % (FLUSH) 0.9 %
5-40 SYRINGE (ML) INJECTION AS NEEDED
Status: DISCONTINUED | OUTPATIENT
Start: 2019-03-07 | End: 2019-03-08 | Stop reason: HOSPADM

## 2019-03-07 RX ORDER — ONDANSETRON 2 MG/ML
4 INJECTION INTRAMUSCULAR; INTRAVENOUS AS NEEDED
Status: DISCONTINUED | OUTPATIENT
Start: 2019-03-07 | End: 2019-03-07 | Stop reason: HOSPADM

## 2019-03-07 RX ORDER — SODIUM CHLORIDE, SODIUM LACTATE, POTASSIUM CHLORIDE, CALCIUM CHLORIDE 600; 310; 30; 20 MG/100ML; MG/100ML; MG/100ML; MG/100ML
125 INJECTION, SOLUTION INTRAVENOUS CONTINUOUS
Status: DISCONTINUED | OUTPATIENT
Start: 2019-03-07 | End: 2019-03-07 | Stop reason: HOSPADM

## 2019-03-07 RX ORDER — FENTANYL CITRATE 50 UG/ML
50 INJECTION, SOLUTION INTRAMUSCULAR; INTRAVENOUS AS NEEDED
Status: DISCONTINUED | OUTPATIENT
Start: 2019-03-07 | End: 2019-03-07 | Stop reason: HOSPADM

## 2019-03-07 RX ORDER — ACETAMINOPHEN 10 MG/ML
1000 INJECTION, SOLUTION INTRAVENOUS ONCE
Status: COMPLETED | OUTPATIENT
Start: 2019-03-07 | End: 2019-03-07

## 2019-03-07 RX ORDER — HYDROMORPHONE HYDROCHLORIDE 1 MG/ML
0.2 INJECTION, SOLUTION INTRAMUSCULAR; INTRAVENOUS; SUBCUTANEOUS
Status: DISCONTINUED | OUTPATIENT
Start: 2019-03-07 | End: 2019-03-07 | Stop reason: HOSPADM

## 2019-03-07 RX ORDER — SODIUM CHLORIDE 0.9 % (FLUSH) 0.9 %
5-40 SYRINGE (ML) INJECTION EVERY 8 HOURS
Status: DISCONTINUED | OUTPATIENT
Start: 2019-03-07 | End: 2019-03-07 | Stop reason: HOSPADM

## 2019-03-07 RX ORDER — TEMAZEPAM 15 MG/1
30 CAPSULE ORAL
Status: DISCONTINUED | OUTPATIENT
Start: 2019-03-07 | End: 2019-03-08 | Stop reason: HOSPADM

## 2019-03-07 RX ORDER — DEXAMETHASONE SODIUM PHOSPHATE 4 MG/ML
INJECTION, SOLUTION INTRA-ARTICULAR; INTRALESIONAL; INTRAMUSCULAR; INTRAVENOUS; SOFT TISSUE AS NEEDED
Status: DISCONTINUED | OUTPATIENT
Start: 2019-03-07 | End: 2019-03-07 | Stop reason: HOSPADM

## 2019-03-07 RX ORDER — CEFAZOLIN SODIUM/WATER 2 G/20 ML
2 SYRINGE (ML) INTRAVENOUS ONCE
Status: COMPLETED | OUTPATIENT
Start: 2019-03-07 | End: 2019-03-07

## 2019-03-07 RX ORDER — ONDANSETRON 2 MG/ML
4 INJECTION INTRAMUSCULAR; INTRAVENOUS
Status: DISCONTINUED | OUTPATIENT
Start: 2019-03-07 | End: 2019-03-08 | Stop reason: HOSPADM

## 2019-03-07 RX ORDER — DIPHENHYDRAMINE HYDROCHLORIDE 50 MG/ML
12.5 INJECTION, SOLUTION INTRAMUSCULAR; INTRAVENOUS AS NEEDED
Status: DISCONTINUED | OUTPATIENT
Start: 2019-03-07 | End: 2019-03-07 | Stop reason: HOSPADM

## 2019-03-07 RX ORDER — MIDAZOLAM HYDROCHLORIDE 1 MG/ML
1 INJECTION, SOLUTION INTRAMUSCULAR; INTRAVENOUS AS NEEDED
Status: DISCONTINUED | OUTPATIENT
Start: 2019-03-07 | End: 2019-03-07 | Stop reason: HOSPADM

## 2019-03-07 RX ORDER — MIDAZOLAM HYDROCHLORIDE 1 MG/ML
0.5 INJECTION, SOLUTION INTRAMUSCULAR; INTRAVENOUS
Status: DISCONTINUED | OUTPATIENT
Start: 2019-03-07 | End: 2019-03-07 | Stop reason: HOSPADM

## 2019-03-07 RX ORDER — SODIUM CHLORIDE 0.9 % (FLUSH) 0.9 %
5-40 SYRINGE (ML) INJECTION EVERY 8 HOURS
Status: DISCONTINUED | OUTPATIENT
Start: 2019-03-07 | End: 2019-03-08 | Stop reason: HOSPADM

## 2019-03-07 RX ORDER — MORPHINE SULFATE 2 MG/ML
2 INJECTION, SOLUTION INTRAMUSCULAR; INTRAVENOUS
Status: DISCONTINUED | OUTPATIENT
Start: 2019-03-07 | End: 2019-03-07 | Stop reason: HOSPADM

## 2019-03-07 RX ORDER — MORPHINE SULFATE 15 MG/1
15 TABLET ORAL
Status: DISCONTINUED | OUTPATIENT
Start: 2019-03-07 | End: 2019-03-08 | Stop reason: HOSPADM

## 2019-03-07 RX ADMIN — LIDOCAINE HYDROCHLORIDE 50 MG: 20 INJECTION, SOLUTION EPIDURAL; INFILTRATION; INTRACAUDAL; PERINEURAL at 12:33

## 2019-03-07 RX ADMIN — MORPHINE SULFATE 15 MG: 15 TABLET ORAL at 22:57

## 2019-03-07 RX ADMIN — SODIUM CHLORIDE, SODIUM LACTATE, POTASSIUM CHLORIDE, AND CALCIUM CHLORIDE 125 ML/HR: 600; 310; 30; 20 INJECTION, SOLUTION INTRAVENOUS at 11:11

## 2019-03-07 RX ADMIN — ACETAMINOPHEN 1000 MG: 10 INJECTION, SOLUTION INTRAVENOUS at 15:35

## 2019-03-07 RX ADMIN — MORPHINE SULFATE 2 MG: 2 INJECTION, SOLUTION INTRAMUSCULAR; INTRAVENOUS at 16:00

## 2019-03-07 RX ADMIN — PROPOFOL 40 MCG/KG/MIN: 10 INJECTION, EMULSION INTRAVENOUS at 12:45

## 2019-03-07 RX ADMIN — PROPOFOL 30 MG: 10 INJECTION, EMULSION INTRAVENOUS at 12:41

## 2019-03-07 RX ADMIN — MIDAZOLAM HYDROCHLORIDE 2 MG: 1 INJECTION, SOLUTION INTRAMUSCULAR; INTRAVENOUS at 12:23

## 2019-03-07 RX ADMIN — FENTANYL CITRATE 25 MCG: 50 INJECTION, SOLUTION INTRAMUSCULAR; INTRAVENOUS at 16:06

## 2019-03-07 RX ADMIN — FENTANYL CITRATE 25 MCG: 50 INJECTION, SOLUTION INTRAMUSCULAR; INTRAVENOUS at 15:48

## 2019-03-07 RX ADMIN — ROPIVACAINE HYDROCHLORIDE 300 MG: 5 INJECTION, SOLUTION EPIDURAL; INFILTRATION; PERINEURAL at 17:02

## 2019-03-07 RX ADMIN — FENTANYL CITRATE 25 MCG: 50 INJECTION, SOLUTION INTRAMUSCULAR; INTRAVENOUS at 15:58

## 2019-03-07 RX ADMIN — PROPOFOL 80 MG: 10 INJECTION, EMULSION INTRAVENOUS at 12:33

## 2019-03-07 RX ADMIN — FENTANYL CITRATE 25 MCG: 50 INJECTION, SOLUTION INTRAMUSCULAR; INTRAVENOUS at 15:51

## 2019-03-07 RX ADMIN — PROPOFOL 30 MG: 10 INJECTION, EMULSION INTRAVENOUS at 12:38

## 2019-03-07 RX ADMIN — Medication 2 G: at 21:14

## 2019-03-07 RX ADMIN — Medication 2 G: at 12:45

## 2019-03-07 NOTE — ANESTHESIA POSTPROCEDURE EVALUATION
Procedure(s):  REMOVE SCREWS AND STAPLES FROM  RIGHT FOOT.    <BSHSIANPOST>    Visit Vitals  /62   Pulse 71   Temp 36.2 °C (97.1 °F)   Resp 13   Ht 5' 4\" (1.626 m)   Wt 86.2 kg (190 lb)   SpO2 99%   BMI 32.61 kg/m²   Popliteal / Saphenous  Nerve Block (Post-op)    right popliteal/saphenous nerve block:  (pain poorly controlled, will be admitted overnight, explained block, pt has had blocks in the past.  & Dr. Joyce Barth present at bedside.)    Risks, benefits, alternatives explained at length and patient agrees to proceed. Time out performed and site for block/surgery identified. Patient  in supine position. SpO2 & ECG monitors in place, 2 L NC O2,  Pt. Awake & conversant. Block needle insertion site >> Chloraprep. An 80 mm, 22G insulated block needle was placed according to anatomical landmarks  . A plantar flexion twitch was elicited at 8.5SW. Negative aspiration and no paresthesia noted. Ropivacaine 0.5% x 30 ml.  was injected without resistance and with gentle aspiration in 3-5 ml increments. Patient tolerated procedure well. No  blood noted. VSS throughout. No apparent complications.

## 2019-03-07 NOTE — PROGRESS NOTES
Pain poorly controlled in PACU, spoke with Dr. Thomas Carnes and Dr. Allison Gupta, decided on popliteal block. Pt now resting comfortably vital signs stable.

## 2019-03-07 NOTE — ROUTINE PROCESS
Patient: Trudi Fleischer MRN: [id number]  SSN: [id number]   YOB: 1965  Age: 46 y.o. Sex: female     Patient is status post Procedure(s):  REMOVE SCREWS AND STAPLES FROM  RIGHT FOOT.     Surgeon(s) and Role:     * Puneet Bianchi DPM - Primary     * Anne Yadav MD - Fellow    Local/Dose/Irrigation:                    Peripheral IV 03/07/19 Left Antecubital (Active)   Site Assessment Clean, dry, & intact 3/7/2019 11:09 AM   Phlebitis Assessment 0 3/7/2019 11:09 AM   Infiltration Assessment 0 3/7/2019 11:09 AM   Dressing Status Clean, dry, & intact 3/7/2019 11:09 AM   Dressing Type Transparent 3/7/2019 11:09 AM            Airway - Endotracheal Tube 03/07/19 (Active)                   Dressing/Packing:  Wound Foot Right-Dressing Type : (XEROFORM, 4X4, CAST PADDING, ACE BANDAGE) (03/07/19 1300)  Splint/Cast:  ]    Other:

## 2019-03-07 NOTE — ANESTHESIA PREPROCEDURE EVALUATION
Anesthetic History               Review of Systems / Medical History      Pulmonary                   Neuro/Psych     seizures    Psychiatric history     Cardiovascular                       GI/Hepatic/Renal                Endo/Other        Arthritis     Other Findings              Physical Exam    Airway  Mallampati: II  TM Distance: > 6 cm  Neck ROM: normal range of motion   Mouth opening: Normal     Cardiovascular  Regular rate and rhythm,  S1 and S2 normal,  no murmur, click, rub, or gallop             Dental  No notable dental hx       Pulmonary  Breath sounds clear to auscultation               Abdominal  GI exam deferred       Other Findings            Anesthetic Plan    ASA: 2  Anesthesia type: MAC            Anesthetic plan and risks discussed with: Patient

## 2019-03-07 NOTE — BRIEF OP NOTE
BRIEF OPERATIVE NOTE    Date of Procedure: 3/7/2019   Preoperative Diagnosis: INFECTION OR INFLAMMATORY REACTION DUE TO DEVICE RIGHT FOOT  Postoperative Diagnosis: INFLAMATORY REACTION    Procedure(s):  REMOVE SCREWS AND STAPLES FROM  RIGHT FOOT  Surgeon(s) and Role:     Malini Barfield DPM - Primary     * Jak Centeno DPM - resident         Surgical Assistant: none    Surgical Staff:  Circ-1: Anya Trujillo RN  Circ-Relief: May Santana RN  Scrub Tech-1: Diemarquisere Finger  Scrub RN-1: Roddy Honeycutt RN  Event Time In Time Out   Incision Start 1251    Incision Close       Anesthesia: MAC   Estimated Blood Loss: 30cc  Specimens: * No specimens in log *   Findings: retained screws and staples   Complications: none  Implants:   Implant Name Type Inv.  Item Serial No.  Lot No. LRB No. Used Action   6.5 X 80MM SCREW   N/A [employer]RA N/A Right 1 Explanted   6.5 X 70 MM SCREW   N/A [employer]TE[employer]RA N/A Right 1 Explanted   speed titan implant [id number]   N/A [employer] [id number] Right 1 Explanted   SPEED TITAN IMPLANT [id number]      Right 1 Explanted

## 2019-03-08 VITALS
RESPIRATION RATE: 16 BRPM | SYSTOLIC BLOOD PRESSURE: 101 MMHG | OXYGEN SATURATION: 94 % | WEIGHT: 190 LBS | HEART RATE: 78 BPM | HEIGHT: 64 IN | BODY MASS INDEX: 32.44 KG/M2 | TEMPERATURE: 98.8 F | DIASTOLIC BLOOD PRESSURE: 53 MMHG

## 2019-03-08 PROCEDURE — 99218 HC RM OBSERVATION: CPT

## 2019-03-08 PROCEDURE — 74011250637 HC RX REV CODE- 250/637: Performed by: PODIATRIST

## 2019-03-08 PROCEDURE — 74011250636 HC RX REV CODE- 250/636: Performed by: PODIATRIST

## 2019-03-08 RX ADMIN — MORPHINE SULFATE 15 MG: 15 TABLET ORAL at 06:41

## 2019-03-08 RX ADMIN — GABAPENTIN 300 MG: 300 CAPSULE ORAL at 08:47

## 2019-03-08 RX ADMIN — TEMAZEPAM 30 MG: 15 CAPSULE ORAL at 00:30

## 2019-03-08 RX ADMIN — Medication 5 ML: at 06:00

## 2019-03-08 RX ADMIN — MORPHINE SULFATE 15 MG: 15 TABLET ORAL at 02:56

## 2019-03-08 RX ADMIN — Medication 2 G: at 04:50

## 2019-03-08 RX ADMIN — Medication 10 ML: at 13:18

## 2019-03-08 RX ADMIN — MORPHINE SULFATE 15 MG: 15 TABLET ORAL at 10:48

## 2019-03-08 NOTE — OP NOTES
[address]   OPERATIVE REPORT    Name:  Tigist Pacheco  MR#:  [id number]  :  1965  ACCOUNT #:  [de-identified]  DATE OF SERVICE:  2019    PREOPERATIVE DIAGNOSIS:  Right foot painful internal fixation, screws in the heel and staples in the lateral aspect of the foot. POSTOPERATIVE DIAGNOSIS:  Right foot painful internal fixation, screws in the heel and staples in the lateral aspect of the foot. PROCEDURE PERFORMED:  Removal of screws and staples. SURGEON:  Karla Anguiano DPM    ASSISTANT:  Jossue Turner DPM - resident. ANESTHESIA:  MAC    COMPLICATIONS:  None. PATHOLOGY:  None. SPECIMENS REMOVED:  none    IMPLANTS:  None. ESTIMATED BLOOD LOSS:  15cc    PROCEDURE:   On 2019, the patient was seen in the preoperative area. We discussed the surgery, the risks, the complications. She was agreeable and she was taken to the operating room. She was taken to the operating room and placed on the operating table in a supine position. After adequate induction of intravenous sedation, the patient was blocked with 0.5% Marcaine with epinephrine. Incision made along the lateral aspect of the heel and carried down to the level of the bump. We identified the two screws and removed them in toto. Once the screws were removed, the area was flushed and the incision was closed with 4-0 nylon. The incision was mapped out on the lateral aspect using a K-wire and then once we saw the K-wire,  we put it through the skin onto the staple and made our incision based on the mapping out. Incision was carried down to the level of bone. The staples were removed, however, both staples snapped, I did still remove all of the staples, both pieces, both staples. The area was flushed. Incisions were closed with 3-0 Vicryl deep and 4-0 nylon on the skin.   She was placed Xeroform followed by a dry sterile dressing and left the operating room to recovery in a stable condition.         SARA Stanton/[id number]  D:  03/07/2019 14:40  T:  03/08/2019 0:56  JOB #:  [id number]

## 2019-03-08 NOTE — ROUTINE PROCESS
Bedside shift change report given to Brooklyn Ivory (oncoming nurse) by Gael Dance, RN(offgoing nurse). Report given with SBAR.

## 2019-03-08 NOTE — PROGRESS NOTES
Foot and Ankle Surgery Progress Note    Patient: Liliana Sorto MRN: [id number]  SSN: [id number]    YOB: 1965  Age: 46 y.o. Sex: female      Admit Date: 3/7/2019    1 Day Post-Op    Procedure:  Procedure(s):  REMOVE SCREWS AND STAPLES FROM  RIGHT FOOT    Subjective:     Patient has complaints of pain. It is throbbing -  When I walked into the room she was sleeping soundly  I had to wake her up    Objective:     Visit Vitals  BP 97/54 (BP 1 Location: Right arm, BP Patient Position: At rest)   Pulse 77   Temp 98.2 °F (36.8 °C)   Resp 16   Ht 5' 4\" (1.626 m)   Wt 86.2 kg (190 lb)   SpO2 94%   BMI 32.61 kg/m²       Temp (24hrs), Av.9 °F (36.6 °C), Min:97.1 °F (36.2 °C), Max:98.2 °F (36.8 °C)      Physical Exam:    Toes are warm and pink   Dressing is clean and dry     Data Review: images and reports reviewed    Lab Review: All lab results for the last 24 hours reviewed. Assessment:     Hospital Problems  Date Reviewed: 2017          Codes Class Noted POA    Fixation hardware in foot ICD-10-CM: Z96.7  ICD-9-CM: V45.89  3/7/2019 Unknown              Plan/Recommendations/Medical Decision Making:      Will d/c pt     Signed By: Brooks Leggett DPM     2019

## 2019-03-08 NOTE — PROGRESS NOTES
TRANSFER - IN REPORT:    Verbal report received from Cristhian Bowman RN(name) on Fallon Matters  being received from [facility]) for routine post - op      Report consisted of patients Situation, Background, Assessment and   Recommendations(SBAR). Information from the following report(s) SBAR, Kardex and Recent Results was reviewed with the receiving nurse. Opportunity for questions and clarification was provided. Assessment completed upon patients arrival to unit and care assumed.

## 2019-03-08 NOTE — DISCHARGE INSTRUCTIONS
INSTRUCTIONS FOLLOWING FOOT SURGERY    ACTIVITY:  · Elevate feet for 48 hours  · Use ice as directed by your doctor  · Use crutches / walker as directed by your doctor, and follow your doctors instructions as to your weight bearing status  -you can bear weight    DIET:  · Clear liquids until no nausea or vomiting; then light diet for the first day  · An advance to regular diet on second day, unless your doctor orders otherwise. PAIN:  · Take pain medication as directed by your doctor. · Call your doctor if pain is not relieved by medication. · Do not take aspirin or blood thinners until directed by your doctor. DRESSING CARE: keep dressing clean and dry, do not remove       FOLLOW-UP PHONE CALLS:  · Calls will be made by nursing staff. · If you had any problems, call your doctor as needed. CALL YOUR DOCTOR IF:  · Excessive bleeding that does not stop after holding mild pressure over the area  · Temperature of 101° F or above  · Redness, excessive swelling or bruising, and/or green or yellow, smelly discharge from incision  · Loss of sensation -cold, white, or blue toes    AFTER ANESTHESIA :   · For the first 24 hours: do not drive, drink alcoholic beverages, or make important decisions. · Be aware of dizziness following anesthesia and while taking pain medication.       DISCHARGE MEDICATIONS:         APPOINTMENT DATE/TIME: please call for an appointment    YOUR DOCTORS PHONE NUMBER: [phone]    Patients signature:  Date: 3/8/2019  Discharging Nurse:

## 2019-03-09 NOTE — H&P
Foot and Ankle Surgery History and Physical    Subjective:      Aldo Ibarra is a 46 y.o. 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    Unspecified adverse effect of anesthesia     TROUBLE WAKING IN THE PAST     Past Surgical History:   Procedure Laterality Date    HX CHOLECYSTECTOMY  1999    HX GI  2017    COLONOSCOPY    HX GYN  2006    TUBAL PREGNANCY AFTER TUBAL LIGATION    HX GYN  2010    UTERINE POLYPS REMOVED    HX GYN  2010    THINNING OF UTERUS    HX HEENT      MOUTH/DENTAL SURGERY- UPPER AND LOWER IMPLANTS    HX ORTHOPAEDIC      REMOVED CHIP    HX ORTHOPAEDIC      CLEANED OUT SUBTAYLOR JOINT    HX ORTHOPAEDIC  2014    FUSED COBALT JOINT    HX ORTHOPAEDIC Right 2015    PLANTAR FASCIITIS     HX ORTHOPAEDIC  03/29/2018    ARTHROSCOPIC DEBRIDEMENT DOUBLE LIGAMENT STABILIZATION RIGHT ANKLE    HX TUBAL LIGATION  2006      Family History   Problem Relation Age of Onset    Hypertension Father     Cancer Father         BLADDER    Other Father         BLOOD CLOTS    COPD Father     Heart Disease Paternal Grandmother     Seizures Brother     Seizures Brother     Seizures Brother     Other Son         BENIGN BONE MARROW TUMORS    Seizures Brother     Psychiatric Disorder Brother         DEPRESSION    Depression Brother     Diabetes Maternal Grandmother         OVARIAN CANCER    Anesth Problems Neg Hx      Social History     Tobacco Use    Smoking status: Former Smoker     Packs/day: 0.50     Years: 1.50     Pack years: 0.75    Smokeless tobacco: Never Used    Tobacco comment: AS TEENAGER   Substance Use Topics    Alcohol use: Yes     Comment: RARELY      Prior to Admission medications    Medication Sig Start Date End Date Taking? Authorizing Provider   OTHER    Yes Provider, Historical   cholecalciferol (VITAMIN D3) 1,000 unit cap Take  by mouth daily. Yes Provider, Historical   gabapentin (NEURONTIN) 300 mg capsule Take 300 mg by mouth daily. Yes Provider, Historical   morphine IR (MS IR) 15 mg tablet Take 1 Tab by mouth every four (4) hours as needed for Pain. Max Daily Amount: 90 mg. Indications: Pain 17  Yes Vijaya Fusi, DPM   promethazine (PHENERGAN) 25 mg tablet Take 1 Tab by mouth every six (6) hours as needed for Nausea. Indications: POST-OPERATIVE NAUSEA AND VOMITING 17  Yes Vijaya Fusi, DPM   morphine IR (MS IR) 15 mg tablet Take 1 Tab by mouth every six (6) hours as needed for Pain. Max Daily Amount: 60 mg. Indications: Pain 3/30/18   Vijaya Fusi, DPM   promethazine (PHENERGAN) 25 mg tablet Take 1 Tab by mouth every six (6) hours as needed for Nausea. Indications: Pain Treatment Adjunct, POST-OPERATIVE NAUSEA AND VOMITING 3/29/18   Vijaya Fusi, DPM   temazepam (RESTORIL) 30 mg capsule Take 30 mg by mouth nightly. Provider, Historical   vortioxetine (TRINTELLIX) 10 mg tablet Take 20 mg by mouth daily. Provider, Historical   QUEtiapine (SEROQUEL) 25 mg tablet Take 75 mg by mouth nightly. TAKES 3 25MG TABS EVERY BEDTIME    Provider, Historical      Allergies   Allergen Reactions    Latex Itching    Lyrica [Pregabalin] Other (comments)     \" affects mood/aggressive\"       Review of Systems:  A comprehensive review of systems was negative except for that written in the History of Present Illness. Objective:      No data found.     Temp (24hrs), Av.1 °F (36.7 °C), Min:97.8 °F (36.6 °C), Max:98.8 °F (37.1 °C)      Physical Exam:  Painful internal fixation    Assessment:     Painful internal fixation    Plan:     Discussed the risk of surgery including infection and nonhewqlaing wounds,  and the risks of that procedure  The patient understands the risks; any and all questions were answered to the patient's satisfaction.     Signed By: Karla Anguiano DPM     March 8, 2019

## (undated) DEVICE — SPONGE LAP 18X18IN STRL -- 5/PK

## (undated) DEVICE — HOOK LOCK LATEX FREE ELASTIC BANDAGE D/L 6INX10YD

## (undated) DEVICE — NEEDLE HYPO 25GA L1.5IN BVL ORIENTED ECLIPSE

## (undated) DEVICE — BASIC PACK: Brand: CONVERTORS

## (undated) DEVICE — Z DISCONTINUED PER MEDLINE (LOW STOCK)  USE 2422770 DRAPE C ARM W54XL78IN FOR FLROSCN

## (undated) DEVICE — Device

## (undated) DEVICE — DRAPE,REIN 53X77,STERILE: Brand: MEDLINE

## (undated) DEVICE — DEVON™ KNEE AND BODY STRAP 60" X 3" (1.5 M X 7.6 CM): Brand: DEVON

## (undated) DEVICE — PADDING CST CRMPD 3INX4YD NS --

## (undated) DEVICE — SUTURE VCRL SZ 4-0 L27IN ABSRB UD L26MM SH 1/2 CIR J415H

## (undated) DEVICE — PADDING CAST 3 INX4 YD STRL

## (undated) DEVICE — SYR 5ML 1/5 GRAD LL NSAF LF --

## (undated) DEVICE — LIGHT HANDLE: Brand: DEVON

## (undated) DEVICE — SURGICAL PROCEDURE PACK BASIN MAJ SET CUST NO CAUT

## (undated) DEVICE — SUTURE ETHLN SZ 4-0 L18IN NONABSORBABLE BLK L19MM PS-2 3/8 1667H

## (undated) DEVICE — X-RAY SPONGES,16 PLY: Brand: DERMACEA

## (undated) DEVICE — SUTURE VCRL SZ 3-0 L27IN ABSRB UD CP-2 L26MM 1/2 CIR REV J868H

## (undated) DEVICE — COVER,TABLE,60X90,STERILE: Brand: MEDLINE

## (undated) DEVICE — DRAPE,EXTREMITY,89X128,STERILE: Brand: MEDLINE

## (undated) DEVICE — 3.0 MM COVAC 50 INTEGRATED CABLE                                    WAND ICW: Brand: COBLATION

## (undated) DEVICE — COVER,MAYO STAND,STERILE: Brand: MEDLINE

## (undated) DEVICE — SYR 10ML LUER LOK 1/5ML GRAD --

## (undated) DEVICE — STRETCH BANDAGE ROLL: Brand: DERMACEA

## (undated) DEVICE — DYONICS 25 INFLOW/OUTFLOW TUBE                                    SET, SINGLE SUCTION, 3 PER BOX

## (undated) DEVICE — SUTURE VCRL SZ 3-0 L27IN ABSRB UD L26MM SH 1/2 CIR J416H

## (undated) DEVICE — PADDING CST 4IN STERILE --

## (undated) DEVICE — NEEDLE HYPO 22GA L1.5IN BLK POLYPR HUB S STL REG BVL STR

## (undated) DEVICE — REM POLYHESIVE ADULT PATIENT RETURN ELECTRODE: Brand: VALLEYLAB

## (undated) DEVICE — INFECTION CONTROL KIT SYS

## (undated) DEVICE — PREP SKN PREVAIL 40ML APPL --

## (undated) DEVICE — HOOK LOCK LATEX FREE ELASTIC BANDAGE 4INX5YD

## (undated) DEVICE — ZIMMER® STERILE DISPOSABLE TOURNIQUET CUFF WITH PLC, DUAL PORT, SINGLE BLADDER, 24 IN. (61 CM)

## (undated) DEVICE — Z DISCONTINUED USE 2425483 (LOW STOCK PER MEDLINE) TAPE UMB L18IN DIA1/8IN WHT COT NONABSORBABLE W/O NDL FOR

## (undated) DEVICE — TOWEL SURG W17XL27IN STD BLU COT NONFENESTRATED PREWASHED

## (undated) DEVICE — GUIDE WIRE 2.5MM

## (undated) DEVICE — SUT ETHLN 3-0 18IN PS2 BLK --

## (undated) DEVICE — SUTURE MCRYL SZ 4-0 L27IN ABSRB UD L19MM PS-2 1/2 CIR PRIM Y426H

## (undated) DEVICE — GRADUATED BOWL: Brand: DEVON

## (undated) DEVICE — 4-PORT MANIFOLD: Brand: NEPTUNE 2

## (undated) DEVICE — (D)SYR 10ML 1/5ML GRAD NSAF -- PKGING CHANGE USE ITEM 338027

## (undated) DEVICE — KIT INSTR W/ 2.4MM STP DRL GUID FOR SM JT DISP SUTURETAK

## (undated) DEVICE — SYR LR LCK 1ML GRAD NSAF 30ML --

## (undated) DEVICE — ARGYLE FRAZIER SURGICAL SUCTION INSTRUMENT 10 FR/CH (3.3 MM): Brand: ARGYLE

## (undated) DEVICE — DERMACEA GAUZE ROLL: Brand: DERMACEA

## (undated) DEVICE — STERILE POLYISOPRENE POWDER-FREE SURGICAL GLOVES: Brand: PROTEXIS

## (undated) DEVICE — SYR IRR BLB 2OZ DISP BLU STRL -- CONVERT TO ITEM 357637

## (undated) DEVICE — 4.0 MM ABRADER STRAIGHT BURRS,                                    POWER/EP-1, AQUA, 8000 MAXIMUM RPM,                                    PACKAGED 6 PER BOX, STERILE

## (undated) DEVICE — DRAPE SHT 3 QTR PROXIMA 53X77 --

## (undated) DEVICE — INTENDED FOR TISSUE SEPARATION, AND OTHER PROCEDURES THAT REQUIRE A SHARP SURGICAL BLADE TO PUNCTURE OR CUT.: Brand: BARD-PARKER ® CARBON RIB-BACK BLADES

## (undated) DEVICE — HANDLE LT SNAP ON ULT DURABLE LENS FOR TRUMPF ALC DISPOSABLE

## (undated) DEVICE — DRESSING IN STRIPPABLE ENVELOPE: Brand: DERMACEA

## (undated) DEVICE — ARTHROSCOPY RICHMOND-LF: Brand: MEDLINE INDUSTRIES, INC.

## (undated) DEVICE — BLADE 4.0MM BANANA S/SU (6/SP): Brand: BEAVER®

## (undated) DEVICE — PENCIL ES L3M BTTN SWCH S STL HEX LOK BLDE ELECTRD HOLSTER

## (undated) DEVICE — HOOK LOCK LATEX FREE ELASTIC BANDAGE 6INX5YD

## (undated) DEVICE — BUR SURG HD L95MM DIA4MM REPL CARB OVL LINVATEC MEDTRONIC

## (undated) DEVICE — ROCKER SWITCH PENCIL BLADE ELECTRODE, HOLSTER: Brand: EDGE

## (undated) DEVICE — ANCHOR SUT SZ 1 OD3MM ID2.2MM BIOCOMPOSITE FOR SM JT 2.4MM: Type: IMPLANTABLE DEVICE | Site: ANKLE | Status: NON-FUNCTIONAL

## (undated) DEVICE — 1200 GUARD II KIT W/5MM TUBE W/O VAC TUBE: Brand: GUARDIAN

## (undated) DEVICE — PADDING CST 4INX4YD --

## (undated) DEVICE — STERILE POLYISOPRENE POWDER-FREE SURGICAL GLOVES WITH EMOLLIENT COATING: Brand: PROTEXIS

## (undated) DEVICE — KERLIX BANDAGE ROLL: Brand: KERLIX

## (undated) DEVICE — PACK,BASIC,SIRUS,V: Brand: MEDLINE

## (undated) DEVICE — SOLUTION IRRIG 3000ML LAC R FLX CONT

## (undated) DEVICE — VESSEL LOOPS,MAXI, BLUE: Brand: DEVON

## (undated) DEVICE — GAUZE SPONGES,12 PLY: Brand: CURITY

## (undated) DEVICE — WAND COVAC 50/IR ICW INTEG SCT

## (undated) DEVICE — NON-ADHERENT STRIPS,OIL EMULSION: Brand: CURITY

## (undated) DEVICE — SOLUTION IV 1000ML 0.9% SOD CHL

## (undated) DEVICE — 4.5 MM SYNOVATOR STRAIGHT BLADES,                                    POWER/EP-1, FOREST GREEN, PACKAGED 6                                    PER BOX, STERILE

## (undated) DEVICE — (D)PREP SKN CHLRAPRP APPL 26ML -- CONVERT TO ITEM 371833

## (undated) DEVICE — C-WIRE PAK DOUBLE ENDED ORTHOPAEDIC WIRE, TROCAR, .062" (1.57 MM): Brand: C-WIRE

## (undated) DEVICE — SUTURE VCRL SZ 3-0 L27IN ABSRB VLT L26MM SH 1/2 CIR J316H

## (undated) DEVICE — DISPOSABLE TOURNIQUET CUFF SINGLE BLADDER, DUAL PORT AND QUICK CONNECT CONNECTOR: Brand: COLOR CUFF

## (undated) DEVICE — 3.5 MM INCISOR STRAIGHT BLADES,                                    POWER/EP-1, MEDIUM GRAY, PACKAGED 6                                    PER BOX, STERILE

## (undated) DEVICE — ZIMMER® STERILE DISPOSABLE TOURNIQUET CUFF WITH PLC, DUAL PORT, SINGLE BLADDER, 34 IN. (86 CM)

## (undated) DEVICE — CANNULATED DRILL

## (undated) DEVICE — NEEDLE HYPO 27GA L1.25IN GRY POLYPR HUB S STL REG BVL STR